# Patient Record
Sex: FEMALE | Race: OTHER | NOT HISPANIC OR LATINO | URBAN - METROPOLITAN AREA
[De-identification: names, ages, dates, MRNs, and addresses within clinical notes are randomized per-mention and may not be internally consistent; named-entity substitution may affect disease eponyms.]

---

## 2022-10-25 ENCOUNTER — OFFICE VISIT (OUTPATIENT)
Dept: PRIMARY CARE CLINIC | Facility: CLINIC | Age: 67
End: 2022-10-25

## 2022-10-25 VITALS
SYSTOLIC BLOOD PRESSURE: 123 MMHG | HEART RATE: 89 BPM | BODY MASS INDEX: 35.18 KG/M2 | OXYGEN SATURATION: 97 % | DIASTOLIC BLOOD PRESSURE: 58 MMHG | TEMPERATURE: 98 F | WEIGHT: 191.19 LBS | HEIGHT: 62 IN

## 2022-10-25 DIAGNOSIS — Z12.31 SCREENING MAMMOGRAM FOR BREAST CANCER: ICD-10-CM

## 2022-10-25 DIAGNOSIS — E78.5 DYSLIPIDEMIA: ICD-10-CM

## 2022-10-25 DIAGNOSIS — R10.32 LLQ PAIN: Primary | ICD-10-CM

## 2022-10-25 DIAGNOSIS — Z78.0 POST-MENOPAUSAL: ICD-10-CM

## 2022-10-25 DIAGNOSIS — Z12.11 SCREEN FOR COLON CANCER: ICD-10-CM

## 2022-10-25 DIAGNOSIS — I10 HYPERTENSION, UNSPECIFIED TYPE: ICD-10-CM

## 2022-10-25 DIAGNOSIS — I10 PRIMARY HYPERTENSION: ICD-10-CM

## 2022-10-25 DIAGNOSIS — R63.4 WEIGHT LOSS: ICD-10-CM

## 2022-10-25 DIAGNOSIS — M54.9 BACK PAIN, UNSPECIFIED BACK LOCATION, UNSPECIFIED BACK PAIN LATERALITY, UNSPECIFIED CHRONICITY: ICD-10-CM

## 2022-10-25 PROCEDURE — 99205 PR OFFICE/OUTPT VISIT, NEW, LEVL V, 60-74 MIN: ICD-10-PCS | Mod: S$PBB,,, | Performed by: NURSE PRACTITIONER

## 2022-10-25 PROCEDURE — 99999 PR PBB SHADOW E&M-NEW PATIENT-LVL V: CPT | Mod: PBBFAC,,, | Performed by: NURSE PRACTITIONER

## 2022-10-25 PROCEDURE — 99205 OFFICE O/P NEW HI 60 MIN: CPT | Mod: PBBFAC,PN | Performed by: NURSE PRACTITIONER

## 2022-10-25 PROCEDURE — 99205 OFFICE O/P NEW HI 60 MIN: CPT | Mod: S$PBB,,, | Performed by: NURSE PRACTITIONER

## 2022-10-25 PROCEDURE — 99999 PR PBB SHADOW E&M-NEW PATIENT-LVL V: ICD-10-PCS | Mod: PBBFAC,,, | Performed by: NURSE PRACTITIONER

## 2022-10-25 RX ORDER — CELECOXIB 100 MG/1
100 CAPSULE ORAL 2 TIMES DAILY PRN
Qty: 60 CAPSULE | Refills: 2 | Status: SHIPPED | OUTPATIENT
Start: 2022-10-25 | End: 2022-11-14 | Stop reason: SDUPTHER

## 2022-10-25 RX ORDER — GABAPENTIN 300 MG/1
300 CAPSULE ORAL NIGHTLY
COMMUNITY
Start: 2022-10-10 | End: 2022-11-14 | Stop reason: SDUPTHER

## 2022-10-25 RX ORDER — ATORVASTATIN CALCIUM 40 MG/1
40 TABLET, FILM COATED ORAL NIGHTLY
COMMUNITY
Start: 2022-10-10 | End: 2022-11-14 | Stop reason: SDUPTHER

## 2022-10-25 RX ORDER — HYDROCHLOROTHIAZIDE 25 MG/1
25 TABLET ORAL DAILY
COMMUNITY
Start: 2022-10-10 | End: 2022-11-14 | Stop reason: ALTCHOICE

## 2022-10-25 NOTE — PROGRESS NOTES
Patience Craft  10/25/2022  [id number]    Lucero Hurtado NP  Patient Care Team:  Lucero Hurtado NP as PCP - General (Family Medicine)      [facility] Note      Chief Complaint:  Chief Complaint   Patient presents with    [facility]     Patient in to [facility] with a complaint of left leg pain that has gotten increasing worse that started in the groin area that radiates to the middle of her back and it hurts to sit, she works offshore, started stretching and it helps and she states when she walk it is noticeable         History of Present Illness:  HPI    Here establish [facility]. No previous PCP.   Works as medic offshore.     Doesn't feel well, LLQ pain, persistent, no change in bowels.   Started left groin/pelvis, now pelvic/left groin and left greater trochanter. Onset several years ago, now becoming worse.    Sweating easily with minimal activity.   Recently began stretching that has helped with mobility, sl decreased pain. Moving better.     Eight lb weight loss past month. 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     No recent health maintenance.     Has had COVID 4 times. Minimal sx.     Takes gabapentin for hip pain. Not sure if effective. Some relief of hip pain with Flexeril.     Had flu shot.    Takes ibuprofen 800 mg for pain as needed with gabapentin. Partial relief. Alternates naproxen and ibuprofen. Takes minimally.     Considering celebrex.     Leaving to go offshore Jan 1.         Review of Systems   Constitutional:  Positive for diaphoresis and weight loss.   HENT:  Negative for hearing loss.    Eyes:  Negative for blurred vision and double vision.   Respiratory:  Positive for shortness of breath (possibly MEJÍA). Negative for cough and sputum production.    Cardiovascular:  Positive for leg swelling (minimal). Negative for chest pain, palpitations and claudication.   Gastrointestinal:  Positive for abdominal pain (LLQ radiates to groin) and heartburn (mild). Negative for blood in  stool, constipation, diarrhea, melena, nausea and vomiting.   Genitourinary:  Positive for frequency and urgency. Negative for dysuria.   Musculoskeletal:  Positive for joint pain (left hip).   Neurological:  Positive for dizziness (with position change, hot shower). Negative for sensory change and weakness.   Psychiatric/Behavioral:  The patient does not have insomnia.        The following were reviewed: Active problem list, medication list, allergies, family history, social history, and Health Maintenance.     History:  Past Medical History:   Diagnosis Date    Hypertension      Past Surgical History:   Procedure Laterality Date     SECTION       Family History   Problem Relation Age of Onset    Hypertension Mother         with CVA    Cancer Mother         breast    Heart disease Father         heavy smoker     Social History     Socioeconomic History    Marital status: Unknown   Tobacco Use    Smoking status: Never    Smokeless tobacco: Never   Substance and Sexual Activity    Alcohol use: Not Currently     Alcohol/week: 2.0 standard drinks     Types: 2 Shots of liquor per week    Drug use: Never     Patient Active Problem List   Diagnosis    Primary hypertension    Dyslipidemia       Review of patient's allergies indicates:  No Known Allergies    Medications:  Current Outpatient Medications on File Prior to Visit   Medication Sig Dispense Refill    atorvastatin (LIPITOR) 40 MG tablet Take 40 mg by mouth every evening.      gabapentin (NEURONTIN) 300 MG capsule Take 300 mg by mouth every evening.      hydroCHLOROthiazide (HYDRODIURIL) 25 MG tablet Take 25 mg by mouth once daily.       No current facility-administered medications on file prior to visit.       Medications have been reviewed and reconciled with patient at visit today.    Barriers to medications present (yes )    Fall since last office visit (no )      Exam:  Vitals:    10/25/22 1053   BP: (!) 123/58   Pulse: 89   Temp: 97.6 °F (36.4 °C)      Weight: 86.7 kg (191 lb 3.2 oz)   Body mass index is 34.97 kg/m².      BP Readings from Last 3 Encounters:   10/25/22 (!) 123/58     Wt Readings from Last 3 Encounters:   10/25/22 1053 86.7 kg (191 lb 3.2 oz)            Physical Exam  Constitutional:       General: She is not in acute distress.     Appearance: She is obese.   HENT:      Right Ear: External ear normal. There is impacted cerumen.      Left Ear: External ear normal. There is impacted cerumen.      Nose: Nose normal.      Mouth/Throat:      Mouth: Mucous membranes are moist.      Pharynx: No oropharyngeal exudate or posterior oropharyngeal erythema.   Eyes:      General: No scleral icterus.     Extraocular Movements: Extraocular movements intact.      Conjunctiva/sclera: Conjunctivae normal.      Pupils: Pupils are equal, round, and reactive to light.   Neck:      Vascular: No carotid bruit.   Cardiovascular:      Rate and Rhythm: Normal rate and regular rhythm.      Pulses:           Dorsalis pedis pulses are 2+ on the right side and 2+ on the left side.        Posterior tibial pulses are 1+ on the right side and 1+ on the left side.      Heart sounds: Normal heart sounds.   Pulmonary:      Effort: Pulmonary effort is normal.      Breath sounds: Normal breath sounds.   Abdominal:      General: Bowel sounds are normal. There is no distension.      Palpations: Abdomen is soft.      Tenderness: There is no abdominal tenderness. There is no right CVA tenderness, left CVA tenderness or guarding.      Hernia: No hernia is present.          Comments: Pain reproduced, no hernia or deformity.    Musculoskeletal:         General: No swelling or deformity. Normal range of motion.      Cervical back: Normal range of motion and neck supple. No tenderness.      Comments: Pain reproduced left SI and left upper gluteal muscle.   No deformity   Lymphadenopathy:      Cervical: No cervical adenopathy.   Skin:     General: Skin is warm and dry.   Neurological:       General: No focal deficit present.      Mental Status: She is alert and oriented to person, place, and time. Mental status is at baseline.      Motor: No weakness.      Gait: Gait normal.   Psychiatric:         Mood and Affect: Mood normal.         Behavior: Behavior normal.       Laboratory Reviewed: (N/A)  No results found for: WBC, HGB, HCT, PLT, CHOL, TRIG, HDL, LDLDIRECT, ALT, AST, NA, K, CL, CREATININE, BUN, CO2, TSH, PSA, INR, GLUF, HGBA1C, MICROALBUR        Health Maintenance  The patient has no Health Maintenance topics of status Not Due  Health Maintenance Due   Topic Date Due    Lipid Panel  Never done    COVID-19 Vaccine (1) Never done    Mammogram  Never done    DEXA Scan  Never done    Colorectal Cancer Screening  Never done    Shingles Vaccine (1 of 2) Never done       Assessment:  Problem List Items Addressed This Visit          Cardiac/Vascular    Primary hypertension     Refill HCTZ. Check CMP         Dyslipidemia     Atorvastatin. Check CMP, lipid panel.          Relevant Orders    Lipid Panel    Lipid Panel     Other Visit Diagnoses       LLQ pain    -  Primary    Relevant Orders    Urinalysis, Reflex to Urine Culture Urine, Clean Catch    POCT URINE DIPSTICK WITHOUT MICROSCOPE    CBC Auto Differential    Comprehensive Metabolic Panel    Screening mammogram for breast cancer        Relevant Orders    Mammo Digital Screening Bilat w/ Gavino    Post-menopausal        Relevant Orders    DXA Bone Density Spine And Hip    Hypertension, unspecified type        Relevant Orders    CBC Auto Differential    Comprehensive Metabolic Panel    Screen for colon cancer        Relevant Orders    Fecal Immunochemical Test (iFOBT)    Back pain, unspecified back location, unspecified back pain laterality, unspecified chronicity        Relevant Medications    celecoxib (CELEBREX) 100 MG capsule    Other Relevant Orders    X-Ray Lumbar Spine 5 View    X-Ray Hip 3 or 4 views Bilateral    Weight loss        Relevant  Orders    TSH    TSH          Left groin pain - start xray L-spine and left hip, UA. Consider CT abd/pelvis.           Plan:  LLQ pain  -     Urinalysis, Reflex to Urine Culture Urine, Clean Catch; Future; Expected date: 10/25/2022  -     POCT URINE DIPSTICK WITHOUT MICROSCOPE  -     CBC Auto Differential; Future; Expected date: 10/25/2022  -     Comprehensive Metabolic Panel; Future; Expected date: 10/25/2022    Screening mammogram for breast cancer  -     Mammo Digital Screening Bilat w/ Gavino; Future; Expected date: 10/25/2022    Post-menopausal  -     DXA Bone Density Spine And Hip; Future; Expected date: 10/25/2022    Hypertension, unspecified type  -     Cancel: IN OFFICE EKG 12-LEAD (to Muse)  -     CBC Auto Differential; Future; Expected date: 10/25/2022  -     Comprehensive Metabolic Panel; Future; Expected date: 10/25/2022    Screen for colon cancer  -     Fecal Immunochemical Test (iFOBT); Future; Expected date: 10/25/2022    Dyslipidemia  -     Lipid Panel; Future; Expected date: 10/25/2022  -     Lipid Panel; Future; Expected date: 10/25/2022    Back pain, unspecified back location, unspecified back pain laterality, unspecified chronicity  -     X-Ray Lumbar Spine 5 View; Future; Expected date: 10/25/2022  -     X-Ray Hip 3 or 4 views Bilateral; Future; Expected date: 10/25/2022  -     celecoxib (CELEBREX) 100 MG capsule; Take 1 capsule (100 mg total) by mouth 2 (two) times daily as needed for Pain (back/hip pain).  Dispense: 60 capsule; Refill: 2    Weight loss  -     TSH; Future; Expected date: 10/25/2022  -     Cancel: T4, FREE; Future; Expected date: 10/25/2022  -     TSH; Future; Expected date: 10/25/2022    Primary hypertension    -Patient's lab results were reviewed and discussed with patient  -Treatment options and alternatives were discussed with the patient. Patient expressed understanding. Patient was given the opportunity to ask questions and be an active participant in their medical care.  Patient had no further questions or concerns at this time.   -Documentation of patient's health and condition was obtained from family member who was present during visit.  -Patient is an overall moderate risk for health complications from their medical conditions.     Pt reports she has only Medicare part A insurance so POC is limited by financial means. Discussed recommendations, pt has decided to defer health maintenance until she has further insurance coverage. She will have lab and xrays done today.    Follow up: Follow up in about 3 weeks (around 11/15/2022).      After visit summary printed and given to patient upon discharge.  Patient goals and care plan are included in After visit summary.    Total medical decision making time was 62 min.  The following issues were discussed: The primary encounter diagnosis was LLQ pain. Diagnoses of Screening mammogram for breast cancer, Post-menopausal, Hypertension, unspecified type, Screen for colon cancer, Dyslipidemia, Back pain, unspecified back location, unspecified back pain laterality, unspecified chronicity, Weight loss, and Primary hypertension were also pertinent to this visit.    Health maintenance needs, recent test results and goals of care discussed with pt and questions answered.

## 2022-10-26 ENCOUNTER — HOSPITAL ENCOUNTER (OUTPATIENT)
Dept: RADIOLOGY | Facility: HOSPITAL | Age: 67
Discharge: HOME | End: 2022-10-26
Attending: NURSE PRACTITIONER

## 2022-10-26 ENCOUNTER — TELEPHONE (OUTPATIENT)
Dept: PRIMARY CARE CLINIC | Facility: CLINIC | Age: 67
End: 2022-10-26

## 2022-10-26 DIAGNOSIS — M54.9 BACK PAIN, UNSPECIFIED BACK LOCATION, UNSPECIFIED BACK PAIN LATERALITY, UNSPECIFIED CHRONICITY: ICD-10-CM

## 2022-10-26 PROCEDURE — 73522 X-RAY EXAM HIPS BI 3-4 VIEWS: CPT | Mod: TC

## 2022-10-26 PROCEDURE — 72110 XR LUMBAR SPINE COMPLETE 5 VIEW: ICD-10-PCS | Mod: 26,,, | Performed by: RADIOLOGY

## 2022-10-26 PROCEDURE — 73522 XR HIP 3 OR 4 VIEWS BILATERAL: ICD-10-PCS | Mod: 26,,, | Performed by: RADIOLOGY

## 2022-10-26 PROCEDURE — 72110 X-RAY EXAM L-2 SPINE 4/>VWS: CPT | Mod: TC

## 2022-10-26 PROCEDURE — 73522 X-RAY EXAM HIPS BI 3-4 VIEWS: CPT | Mod: 26,,, | Performed by: RADIOLOGY

## 2022-10-26 PROCEDURE — 72110 X-RAY EXAM L-2 SPINE 4/>VWS: CPT | Mod: 26,,, | Performed by: RADIOLOGY

## 2022-10-26 NOTE — TELEPHONE ENCOUNTER
----- Message from Lucero Hurtado NP sent at 10/26/2022  9:08 AM CDT -----  Xrays show severe degenerative changes/arthritis to spine.   Would benefit from pain mgmt/AGUILA if other pain control measures fail.   I'm checking to see if someone will reach out to her to help get part B.

## 2022-10-26 NOTE — TELEPHONE ENCOUNTER
Called patient and notified of the results and recommendations. Patient verbalized understanding, Arelis Ramos

## 2022-10-27 ENCOUNTER — TELEPHONE (OUTPATIENT)
Dept: PRIMARY CARE CLINIC | Facility: CLINIC | Age: 67
End: 2022-10-27

## 2022-10-27 NOTE — TELEPHONE ENCOUNTER
----- Message from Lucero Hurtado NP sent at 10/27/2022  7:55 AM CDT -----  MARITZA mildly elevated but near goal for your age range. Discuss whether to start replacement at upcoming appt.

## 2022-11-14 ENCOUNTER — OFFICE VISIT (OUTPATIENT)
Dept: PRIMARY CARE CLINIC | Facility: CLINIC | Age: 67
End: 2022-11-14
Payer: MEDICARE

## 2022-11-14 VITALS
DIASTOLIC BLOOD PRESSURE: 68 MMHG | HEART RATE: 86 BPM | TEMPERATURE: 98 F | SYSTOLIC BLOOD PRESSURE: 133 MMHG | BODY MASS INDEX: 35.63 KG/M2 | WEIGHT: 193.63 LBS | OXYGEN SATURATION: 96 % | HEIGHT: 62 IN

## 2022-11-14 DIAGNOSIS — R94.6 THYROID FUNCTION TEST ABNORMAL: Primary | ICD-10-CM

## 2022-11-14 DIAGNOSIS — M16.0 OSTEOARTHRITIS OF BOTH HIPS, UNSPECIFIED OSTEOARTHRITIS TYPE: Chronic | ICD-10-CM

## 2022-11-14 DIAGNOSIS — M16.10 OSTEOARTHRITIS OF PELVIC REGION: ICD-10-CM

## 2022-11-14 DIAGNOSIS — M54.9 BACK PAIN, UNSPECIFIED BACK LOCATION, UNSPECIFIED BACK PAIN LATERALITY, UNSPECIFIED CHRONICITY: ICD-10-CM

## 2022-11-14 DIAGNOSIS — E66.01 CLASS 2 SEVERE OBESITY WITH SERIOUS COMORBIDITY AND BODY MASS INDEX (BMI) OF 35.0 TO 35.9 IN ADULT, UNSPECIFIED OBESITY TYPE: ICD-10-CM

## 2022-11-14 DIAGNOSIS — E78.5 DYSLIPIDEMIA: Chronic | ICD-10-CM

## 2022-11-14 DIAGNOSIS — I10 PRIMARY HYPERTENSION: Chronic | ICD-10-CM

## 2022-11-14 PROCEDURE — 99213 OFFICE O/P EST LOW 20 MIN: CPT | Mod: PBBFAC,PN | Performed by: INTERNAL MEDICINE

## 2022-11-14 PROCEDURE — 99215 OFFICE O/P EST HI 40 MIN: CPT | Mod: S$PBB,,, | Performed by: INTERNAL MEDICINE

## 2022-11-14 PROCEDURE — 99999 PR PBB SHADOW E&M-EST. PATIENT-LVL III: ICD-10-PCS | Mod: PBBFAC,,, | Performed by: INTERNAL MEDICINE

## 2022-11-14 PROCEDURE — 99999 PR PBB SHADOW E&M-EST. PATIENT-LVL III: CPT | Mod: PBBFAC,,, | Performed by: INTERNAL MEDICINE

## 2022-11-14 PROCEDURE — 99215 PR OFFICE/OUTPT VISIT, EST, LEVL V, 40-54 MIN: ICD-10-PCS | Mod: S$PBB,,, | Performed by: INTERNAL MEDICINE

## 2022-11-14 RX ORDER — HYDROCHLOROTHIAZIDE 25 MG/1
25 TABLET ORAL DAILY
Status: CANCELLED | OUTPATIENT
Start: 2022-11-14

## 2022-11-14 RX ORDER — GABAPENTIN 300 MG/1
300 CAPSULE ORAL NIGHTLY
Qty: 90 CAPSULE | Refills: 1 | Status: SHIPPED | OUTPATIENT
Start: 2022-11-14 | End: 2023-05-13

## 2022-11-14 RX ORDER — CELECOXIB 100 MG/1
100 CAPSULE ORAL 2 TIMES DAILY PRN
Qty: 180 CAPSULE | Refills: 1 | Status: SHIPPED | OUTPATIENT
Start: 2022-11-14 | End: 2023-05-13

## 2022-11-14 RX ORDER — ACETAMINOPHEN 160 MG/5ML
200 SUSPENSION, ORAL (FINAL DOSE FORM) ORAL DAILY
Qty: 90 CAPSULE | Refills: 1 | Status: SHIPPED | OUTPATIENT
Start: 2022-11-14 | End: 2023-08-08

## 2022-11-14 RX ORDER — CHLORTHALIDONE 25 MG/1
25 TABLET ORAL DAILY
Qty: 30 TABLET | Refills: 11 | Status: SHIPPED | OUTPATIENT
Start: 2022-11-14 | End: 2023-11-27 | Stop reason: SDUPTHER

## 2022-11-14 RX ORDER — ATORVASTATIN CALCIUM 40 MG/1
40 TABLET, FILM COATED ORAL NIGHTLY
Qty: 90 TABLET | Refills: 1 | Status: SHIPPED | OUTPATIENT
Start: 2022-11-14 | End: 2023-06-01

## 2022-11-14 RX ORDER — GABAPENTIN 100 MG/1
100 CAPSULE ORAL 2 TIMES DAILY PRN
Qty: 180 CAPSULE | Refills: 1 | Status: SHIPPED | OUTPATIENT
Start: 2022-11-14 | End: 2023-08-08

## 2022-11-14 NOTE — PATIENT INSTRUCTIONS
Cont monitor BP at home - limit sodium intake - compression socks/elevate legs    Cont stretches and stay active and mobile

## 2022-11-14 NOTE — PROGRESS NOTES
Patience Craft  11/14/2022  [id number]    Natalie Guzmán MD  Patient Care Team:  Natalie Guzmán MD as PCP - General (Internal Medicine)  Lucero Hurtado NP as Nurse Practitioner (Family Medicine)    [facility] Note    Chief Complaint:  Chief Complaint   Patient presents with    Follow-up    Medication refills      History of Present Illness:  Met w Lucero Hurtado, 10/25/22, to establish care.  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     Pt w Medicare part A insurance only so POC limited by financial constraints and pt deferring some recommended health maintenance until she has further insurance coverage.     L hip/groin and midline buttock (base of spine) pain  Doing stretches that's helping.    C/o swelling R leg when up on feet. Compression socks helping.    Has tried taking celebrex BID per rec of Lucero Hurtado which has helped.    Intermittent dizziness when lying down.    Gabapentin helped but sedating.    Decreased appetite - unintentional weight loss - increased diaphoresis worsening over past 6 mos.    Review of Systems   Constitutional:  Positive for diaphoresis and weight loss.   HENT:  Negative for hearing loss.    Eyes:  Negative for blurred vision and double vision.   Respiratory:  Positive for shortness of breath (possibly MEJÍA). Negative for cough and sputum production.    Cardiovascular:  Positive for leg swelling (minimal). Negative for chest pain, palpitations and claudication.   Gastrointestinal:  Positive for abdominal pain (LLQ radiates to groin) and heartburn (mild). Negative for blood in stool, constipation, diarrhea, melena, nausea and vomiting.   Genitourinary:  Positive for frequency and urgency. Negative for dysuria.   Musculoskeletal:  Positive for joint pain (left hip).   Neurological:  Positive for dizziness (with position change, hot shower). Negative for sensory change and weakness.   Psychiatric/Behavioral:  The patient does  not have insomnia.      The following were reviewed: Active problem list, medication list, allergies, family history, social history, and Health Maintenance.     History:  Past Medical History:   Diagnosis Date    Hypertension      Past Surgical History:   Procedure Laterality Date     SECTION       Family History   Problem Relation Age of Onset    Hypertension Mother         with CVA    Cancer Mother         breast    Heart disease Father         heavy smoker     Social History     Socioeconomic History    Marital status: Other   Tobacco Use    Smoking status: Never    Smokeless tobacco: Never   Substance and Sexual Activity    Alcohol use: Not Currently     Alcohol/week: 2.0 standard drinks     Types: 2 Shots of liquor per week    Drug use: Never     Patient Active Problem List   Diagnosis    Primary hypertension    Dyslipidemia    Osteoarthritis, hip, bilateral    Back pain    Thyroid function test abnormal    Class 2 severe obesity with serious comorbidity and body mass index (BMI) of 35.0 to 35.9 in adult    Osteoarthritis of pelvic region       Review of patient's allergies indicates:  No Known Allergies    Medications:  No current outpatient medications on file prior to visit.     No current facility-administered medications on file prior to visit.     Medications have been reviewed and reconciled with patient at visit today.    Barriers to medications present (yes)  Financial primarily    Exam:  Vitals:    22 0824   BP: 133/68   Pulse: 86   Temp: 97.7 °F (36.5 °C)     Weight: 87.8 kg (193 lb 9.6 oz)   Body mass index is 35.41 kg/m².    BP Readings from Last 3 Encounters:   22 133/68   10/25/22 (!) 123/58     Wt Readings from Last 3 Encounters:   22 0824 87.8 kg (193 lb 9.6 oz)   10/25/22 1053 86.7 kg (191 lb 3.2 oz)      Physical Exam  Constitutional:       General: She is not in acute distress.     Appearance: She is obese.   HENT:      Right Ear: External ear normal.      Left  Ear: External ear normal.      Nose: Nose normal.      Mouth/Throat:      Mouth: Mucous membranes are moist.      Pharynx: No oropharyngeal exudate or posterior oropharyngeal erythema.   Eyes:      General: No scleral icterus.     Extraocular Movements: Extraocular movements intact.      Conjunctiva/sclera: Conjunctivae normal.   Neck:      Vascular: No carotid bruit.   Cardiovascular:      Rate and Rhythm: Normal rate and regular rhythm.      Heart sounds: Normal heart sounds.   Pulmonary:      Effort: Pulmonary effort is normal.      Breath sounds: Normal breath sounds.   Abdominal:      General: Bowel sounds are normal. There is no distension.      Palpations: Abdomen is soft.      Tenderness: There is no abdominal tenderness. There is no right CVA tenderness, left CVA tenderness or guarding.      Hernia: No hernia is present.   Musculoskeletal:         General: No swelling or deformity. Normal range of motion.      Cervical back: Normal range of motion and neck supple. No tenderness.   Lymphadenopathy:      Cervical: No cervical adenopathy.   Skin:     General: Skin is warm and dry.   Neurological:      General: No focal deficit present.      Mental Status: She is alert and oriented to person, place, and time. Mental status is at baseline.      Motor: No weakness.      Gait: Gait normal.   Psychiatric:         Mood and Affect: Mood normal.         Behavior: Behavior normal.     Laboratory Reviewed: Yes  Lab Results   Component Value Date    WBC 7.56 10/26/2022    HGB 14.3 10/26/2022    HCT 44.1 10/26/2022     10/26/2022    CHOL 155 10/26/2022    TRIG 76 10/26/2022    HDL 45 10/26/2022    ALT 21 10/26/2022    AST 17 10/26/2022     10/26/2022    K 3.8 10/26/2022     10/26/2022    CREATININE 0.8 10/26/2022    BUN 10 10/26/2022    CO2 22 (L) 10/26/2022    TSH 5.722 (H) 10/26/2022     10/26/22: LDL 94.8 free T4  0.89 (low normal) eGFR > 60    Xray Hip 10/26/22: No acute osseous abnormality.   "Prominent degenerative findings of the left hip noted including joint space loss, subcortical sclerosis and osteophyte formation.  Minimal right hip degenerative findings.  Moderate to severe degenerative findings of the lower lumbar spine.  Pubic symphysis degenerative findings noted.  Soft tissues unremarkable. Impression: Degenerative findings most prevalent at the lumbar spine and left hip.    Health Maintenance  Health Maintenance Topics with due status: Not Due       Topic Last Completion Date    Lipid Panel 10/26/2022     Health Maintenance Due   Topic Date Due    COVID-19 Vaccine (1) Never done    Mammogram  Never done    DEXA Scan  Never done    Colorectal Cancer Screening  Never done    Shingles Vaccine (1 of 2) Never done     Assessment:  Problem List Items Addressed This Visit          Cardiac/Vascular    Primary hypertension (Chronic)     Cont chlorthalidone (switch from HCTZ) cont efforts to limit sodium and to stay mobile and active         Dyslipidemia (Chronic)     Cont statin - consider add CoQ10             Endocrine    Class 2 severe obesity with serious comorbidity and body mass index (BMI) of 35.0 to 35.9 in adult (Chronic)     Reports unintentional weight loss but has been eating better while on[place]shore - encourage healthy weight loss - may help decrease OA pain - f/u on repeat TFTs in Dec         Thyroid function test abnormal - Primary    Relevant Orders    TSH    T4, Free       Orthopedic    Osteoarthritis, hip, bilateral (Chronic)     L > R encourage to stay mobile and active to extent able - cont stretches, celebrex         Back pain (Chronic)     Note Moderate to severe degenerative findings of the lower lumbar spine on Xray 10/26/22. May warrant further evaluation however pt has financial/insurance limitations. Cont stretching exercises, celebrex, gabapentin as needed/tolerated. Monitor for "red flag" symptoms or findings.         Relevant Medications    celecoxib (CELEBREX) 100 MG " capsule    Osteoarthritis of pelvic region     Pubic symphysis degenerative findings noted on Xray 10/26/22- cont celebrex          Plan:  Thyroid function test abnormal  -     TSH; Future; Expected date: 12/05/2022  -     T4, Free; Future; Expected date: 12/05/2022    Back pain, unspecified back location, unspecified back pain laterality, unspecified chronicity  -     celecoxib (CELEBREX) 100 MG capsule; Take 1 capsule (100 mg total) by mouth 2 (two) times daily as needed for Pain (back/hip pain).  Dispense: 180 capsule; Refill: 1    Osteoarthritis of both hips, unspecified osteoarthritis type    Dyslipidemia    Primary hypertension    Class 2 severe obesity with serious comorbidity and body mass index (BMI) of 35.0 to 35.9 in adult, unspecified obesity type    Osteoarthritis of pelvic region    Other orders  -     atorvastatin (LIPITOR) 40 MG tablet; Take 1 tablet (40 mg total) by mouth every evening.  Dispense: 90 tablet; Refill: 1  -     chlorthalidone (HYGROTEN) 25 MG Tab; Take 1 tablet (25 mg total) by mouth once daily.  Dispense: 30 tablet; Refill: 11  -     gabapentin (NEURONTIN) 300 MG capsule; Take 1 capsule (300 mg total) by mouth every evening.  Dispense: 90 capsule; Refill: 1  -     gabapentin (NEURONTIN) 100 MG capsule; Take 1 capsule (100 mg total) by mouth 2 (two) times daily as needed (take in morning and afternoon as needed for nerve pain).  Dispense: 180 capsule; Refill: 1  -     coenzyme Q10 200 mg capsule; Take 200 mg by mouth once daily.  Dispense: 90 capsule; Refill: 1      -Patient's lab and Xray results were reviewed and discussed with patient  -Treatment options and alternatives were discussed with the patient. Patient expressed understanding. Patient was given the opportunity to ask questions and be an active participant in their medical care. Patient had no further questions or concerns at this time.    -Patient is an overall moderate risk for health complications from their medical  conditions.     Follow up: Follow up in about 23 days (around 12/7/2022) for Follow Up w either me or Lucero.    After visit summary printed and given to patient upon discharge.  Patient care plan included in After visit summary.    Total medical decision making time was 62 min.  The following issues were discussed: The primary encounter diagnosis was Thyroid function test abnormal. Diagnoses of Back pain, unspecified back location, unspecified back pain laterality, unspecified chronicity, Osteoarthritis of both hips, unspecified osteoarthritis type, Dyslipidemia, Primary hypertension, Class 2 severe obesity with serious comorbidity and body mass index (BMI) of 35.0 to 35.9 in adult, unspecified obesity type, and Osteoarthritis of pelvic region were also pertinent to this visit.    Health maintenance needs, recent test results and goals of care discussed with pt and questions answered.

## 2022-12-18 PROBLEM — E66.01 CLASS 2 SEVERE OBESITY WITH SERIOUS COMORBIDITY AND BODY MASS INDEX (BMI) OF 35.0 TO 35.9 IN ADULT: Status: ACTIVE | Noted: 2022-11-14

## 2022-12-18 PROBLEM — M54.9 BACK PAIN: Chronic | Status: ACTIVE | Noted: 2022-12-18

## 2022-12-18 PROBLEM — M54.9 BACK PAIN: Chronic | Status: ACTIVE | Noted: 2022-11-14

## 2022-12-18 PROBLEM — E66.812 CLASS 2 SEVERE OBESITY WITH SERIOUS COMORBIDITY AND BODY MASS INDEX (BMI) OF 35.0 TO 35.9 IN ADULT: Status: ACTIVE | Noted: 2022-11-14

## 2022-12-18 PROBLEM — E78.5 DYSLIPIDEMIA: Chronic | Status: ACTIVE | Noted: 2022-10-25

## 2022-12-18 PROBLEM — R94.6 THYROID FUNCTION TEST ABNORMAL: Status: ACTIVE | Noted: 2022-12-18

## 2022-12-18 PROBLEM — M16.0 OSTEOARTHRITIS, HIP, BILATERAL: Chronic | Status: ACTIVE | Noted: 2022-11-14

## 2022-12-18 PROBLEM — M54.9 BACK PAIN: Status: ACTIVE | Noted: 2022-12-18

## 2022-12-18 PROBLEM — E66.812 CLASS 2 SEVERE OBESITY WITH SERIOUS COMORBIDITY AND BODY MASS INDEX (BMI) OF 35.0 TO 35.9 IN ADULT: Chronic | Status: ACTIVE | Noted: 2022-11-14

## 2022-12-18 PROBLEM — E66.01 CLASS 2 SEVERE OBESITY WITH SERIOUS COMORBIDITY AND BODY MASS INDEX (BMI) OF 35.0 TO 35.9 IN ADULT: Chronic | Status: ACTIVE | Noted: 2022-11-14

## 2022-12-18 PROBLEM — M16.10 OSTEOARTHRITIS OF PELVIC REGION: Status: ACTIVE | Noted: 2022-11-14

## 2022-12-18 PROBLEM — I10 PRIMARY HYPERTENSION: Chronic | Status: ACTIVE | Noted: 2022-10-25

## 2022-12-18 NOTE — ASSESSMENT & PLAN NOTE
Reports unintentional weight loss but has been eating better while [place] - encourage healthy weight loss - may help decrease OA pain - f/u on repeat TFTs in Dec

## 2022-12-18 NOTE — ASSESSMENT & PLAN NOTE
"Note Moderate to severe degenerative findings of the lower lumbar spine on Xray 10/26/22. May warrant further evaluation however pt has financial/insurance limitations. Cont stretching exercises, celebrex, gabapentin as needed/tolerated. Monitor for "red flag" symptoms or findings.  "

## 2022-12-20 ENCOUNTER — TELEPHONE (OUTPATIENT)
Dept: PRIMARY CARE CLINIC | Facility: CLINIC | Age: 67
End: 2022-12-20
Payer: MEDICARE

## 2022-12-20 NOTE — TELEPHONE ENCOUNTER
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  Check if she wants to schedule f/u w either Lucero or I before she goes?  Does she need refills on any of her medicines?  Has she gotten debrox to use for her ears? If she hasn't please advise her to do so (5 drops each ear 2x daily for 4-5 days) then schedule f/u Nurse visit to recheck ears for possible ear wash if needed - may help w her positional vertigo or at least allow visualization of TMs.  I had ordered repeat TSH/free T4 for Dec but she hasn't had these done yet.   Please assist her w scheduling to have these drawn this week so we can at least advise her on next steps before she goes whether or not we see her for actual clinic visit or not ...    Thank you!

## 2022-12-20 NOTE — TELEPHONE ENCOUNTER
Spoke with Patient, states that she is not going back off shore. She is starting a new job and wants to wait for her new insurance to start before she schedules f/u. Patient states that she will call us to make appointment.

## 2023-01-20 ENCOUNTER — PATIENT OUTREACH (OUTPATIENT)
Dept: ADMINISTRATIVE | Facility: HOSPITAL | Age: 68
End: 2023-01-20
Payer: MEDICARE

## 2023-01-20 NOTE — PROGRESS NOTES
Mammogram report: Attempted to contact the patient to schedule overdue mammogram, left voicemail.

## 2023-04-26 ENCOUNTER — TELEPHONE (OUTPATIENT)
Dept: PRIMARY CARE CLINIC | Facility: CLINIC | Age: 68
End: 2023-04-26

## 2023-06-01 RX ORDER — ATORVASTATIN CALCIUM 40 MG/1
TABLET, FILM COATED ORAL
Qty: 90 TABLET | Refills: 0 | Status: SHIPPED | OUTPATIENT
Start: 2023-06-01 | End: 2023-08-24

## 2023-08-07 ENCOUNTER — OFFICE VISIT (OUTPATIENT)
Dept: PRIMARY CARE CLINIC | Facility: CLINIC | Age: 68
End: 2023-08-07

## 2023-08-07 ENCOUNTER — HOSPITAL ENCOUNTER (OUTPATIENT)
Dept: RADIOLOGY | Facility: HOSPITAL | Age: 68
Discharge: HOME | End: 2023-08-07
Attending: NURSE PRACTITIONER

## 2023-08-07 ENCOUNTER — TELEPHONE (OUTPATIENT)
Dept: PRIMARY CARE CLINIC | Facility: CLINIC | Age: 68
End: 2023-08-07

## 2023-08-07 VITALS
HEART RATE: 94 BPM | OXYGEN SATURATION: 97 % | TEMPERATURE: 98 F | BODY MASS INDEX: 33.68 KG/M2 | DIASTOLIC BLOOD PRESSURE: 78 MMHG | SYSTOLIC BLOOD PRESSURE: 128 MMHG | WEIGHT: 183 LBS | HEIGHT: 62 IN

## 2023-08-07 DIAGNOSIS — R10.30 LOWER ABDOMINAL PAIN: Primary | ICD-10-CM

## 2023-08-07 DIAGNOSIS — R94.6 THYROID FUNCTION TEST ABNORMAL: ICD-10-CM

## 2023-08-07 DIAGNOSIS — R10.30 LOWER ABDOMINAL PAIN: ICD-10-CM

## 2023-08-07 PROBLEM — E66.01 CLASS 2 SEVERE OBESITY WITH SERIOUS COMORBIDITY AND BODY MASS INDEX (BMI) OF 35.0 TO 35.9 IN ADULT: Chronic | Status: RESOLVED | Noted: 2022-11-14 | Resolved: 2023-08-07

## 2023-08-07 PROBLEM — E66.812 CLASS 2 SEVERE OBESITY WITH SERIOUS COMORBIDITY AND BODY MASS INDEX (BMI) OF 35.0 TO 35.9 IN ADULT: Chronic | Status: RESOLVED | Noted: 2022-11-14 | Resolved: 2023-08-07

## 2023-08-07 PROCEDURE — 99215 OFFICE O/P EST HI 40 MIN: CPT | Mod: S$PBB,,, | Performed by: NURSE PRACTITIONER

## 2023-08-07 PROCEDURE — 74178 CT ABDOMEN PELVIS W WO CONTRAST: ICD-10-PCS | Mod: 26,,, | Performed by: RADIOLOGY

## 2023-08-07 PROCEDURE — 99215 PR OFFICE/OUTPT VISIT, EST, LEVL V, 40-54 MIN: ICD-10-PCS | Mod: S$PBB,,, | Performed by: NURSE PRACTITIONER

## 2023-08-07 PROCEDURE — 99214 OFFICE O/P EST MOD 30 MIN: CPT | Mod: PBBFAC,PN,25 | Performed by: NURSE PRACTITIONER

## 2023-08-07 PROCEDURE — 99999 PR PBB SHADOW E&M-EST. PATIENT-LVL IV: CPT | Mod: PBBFAC,,, | Performed by: NURSE PRACTITIONER

## 2023-08-07 PROCEDURE — 99999 PR PBB SHADOW E&M-EST. PATIENT-LVL IV: ICD-10-PCS | Mod: PBBFAC,,, | Performed by: NURSE PRACTITIONER

## 2023-08-07 PROCEDURE — 25500020 PHARM REV CODE 255: Performed by: NURSE PRACTITIONER

## 2023-08-07 PROCEDURE — 74178 CT ABD&PLV WO CNTR FLWD CNTR: CPT | Mod: TC

## 2023-08-07 PROCEDURE — 74178 CT ABD&PLV WO CNTR FLWD CNTR: CPT | Mod: 26,,, | Performed by: RADIOLOGY

## 2023-08-07 RX ADMIN — IOHEXOL 100 ML: 350 INJECTION, SOLUTION INTRAVENOUS at 01:08

## 2023-08-07 NOTE — ASSESSMENT & PLAN NOTE
Subacute.  With associated diarrhea, weight los, decreased appetite and urinary frequency.   CT abd/pelvis stat.   CBC, HFP, RFP, UA.   To ER if sx worsen.

## 2023-08-07 NOTE — PROGRESS NOTES
Patience Craft  08/07/2023  [id number]    Natalie Guzmán MD  Patient Care Team:  Natalie Guzmán MD as PCP - General (Internal Medicine)  Lucero Hurtado NP as Nurse Practitioner (Family Medicine)      [facility]      Chief Complaint:  Chief Complaint   Patient presents with    Establish Care    Chest Pain    Abdominal Pain     X 6 months loss of appetite loss of 17 lbs, Pain 4 LLQ    Dizziness    Excessive Sweating    Back Pain     X 2 months       History of Present Illness:  HPI    Seen today for abdominal pain.     LOV 11/2022. Back pain. Only recently started Medicare A/B.   Works off shore/out of state. In [place] for limited time during the year.     Unintentional weight loss and occasional diarrhea. Decreased appetite.   Pain across lower abdomen/pelvis. Becoming worse. Onset of sx within past couple of months.       Review of Systems   Constitutional:  Positive for appetite change and unexpected weight change. Negative for activity change and fever.   HENT:  Negative for congestion.    Respiratory:  Negative for cough, chest tightness and shortness of breath.    Cardiovascular:  Negative for chest pain.   Gastrointestinal:  Positive for abdominal pain and diarrhea. Negative for abdominal distention, anal bleeding and rectal pain.   Genitourinary:  Positive for frequency. Negative for decreased urine volume, difficulty urinating, dysuria and flank pain.   Musculoskeletal:  Positive for back pain (LBP).   Neurological:  Positive for dizziness. Negative for headaches.         The following were reviewed: Active problem list, medication list, allergies, family history, social history, and Health Maintenance.       Medications:  Current Outpatient Medications on File Prior to Visit   Medication Sig Dispense Refill    atorvastatin (LIPITOR) 40 MG tablet TAKE 1 TABLET BY MOUTH ONCE DAILY IN THE EVENING 90 tablet 0    chlorthalidone (HYGROTEN) 25 MG Tab Take 1 tablet (25 mg total) by mouth  once daily. 30 tablet 11    coenzyme Q10 200 mg capsule Take 200 mg by mouth once daily. 90 capsule 1    gabapentin (NEURONTIN) 100 MG capsule Take 1 capsule (100 mg total) by mouth 2 (two) times daily as needed (take in morning and afternoon as needed for nerve pain). 180 capsule 1     No current facility-administered medications on file prior to visit.       Medications have been reviewed and reconciled with patient at visit today.    Barriers to medications present (no )    Fall since last office visit (no )      Exam:  Vitals:    08/07/23 0820   BP: 128/78   Pulse: 94   Temp: 97.6 °F (36.4 °C)     Weight: 83 kg (183 lb)   Body mass index is 33.47 kg/m².      BP Readings from Last 3 Encounters:   08/07/23 128/78   11/14/22 133/68   10/25/22 (!) 123/58     Wt Readings from Last 3 Encounters:   08/07/23 0820 83 kg (183 lb)   11/14/22 0824 87.8 kg (193 lb 9.6 oz)   10/25/22 1053 86.7 kg (191 lb 3.2 oz)            Physical Exam  Constitutional:       General: She is not in acute distress.     Appearance: She is not ill-appearing.   HENT:      Head: Normocephalic.      Right Ear: There is impacted cerumen.      Left Ear: Tympanic membrane normal.      Nose: Nose normal.      Mouth/Throat:      Mouth: Mucous membranes are moist.      Pharynx: No oropharyngeal exudate or posterior oropharyngeal erythema.   Eyes:      General: No scleral icterus.  Cardiovascular:      Rate and Rhythm: Normal rate and regular rhythm.      Heart sounds: Normal heart sounds.   Pulmonary:      Effort: Pulmonary effort is normal.      Breath sounds: Normal breath sounds.   Abdominal:      General: Bowel sounds are normal. There is no distension.      Palpations: Abdomen is soft. There is no mass.      Tenderness: There is abdominal tenderness (Mid-lower abd and LLQ.). There is rebound. There is no right CVA tenderness, left CVA tenderness or guarding.      Hernia: No hernia is present.   Musculoskeletal:         General: No swelling.       Cervical back: Neck supple.   Lymphadenopathy:      Cervical: No cervical adenopathy.   Skin:     General: Skin is warm and dry.      Coloration: Skin is not jaundiced.   Neurological:      Mental Status: She is alert. Mental status is at baseline.   Psychiatric:         Mood and Affect: Mood normal.         Behavior: Behavior normal.         Laboratory Reviewed: (Yes)  Lab Results   Component Value Date    WBC 7.56 10/26/2022    HGB 14.3 10/26/2022    HCT 44.1 10/26/2022     10/26/2022    CHOL 155 10/26/2022    TRIG 76 10/26/2022    HDL 45 10/26/2022    ALT 21 10/26/2022    AST 17 10/26/2022     10/26/2022    K 3.8 10/26/2022     10/26/2022    CREATININE 0.8 10/26/2022    BUN 10 10/26/2022    CO2 22 (L) 10/26/2022    TSH 5.722 (H) 10/26/2022           Health Maintenance  Health Maintenance Topics with due status: Not Due       Topic Last Completion Date    Influenza Vaccine 09/21/2022    Lipid Panel 10/26/2022     Health Maintenance Due   Topic Date Due    COVID-19 Vaccine (1) Never done    Mammogram  Never done    Hemoglobin A1c (Diabetic Prevention Screening)  Never done    DEXA Scan  Never done    Colorectal Cancer Screening  Never done    Shingles Vaccine (1 of 2) Never done         Assessment:  Problem List Items Addressed This Visit          Endocrine    Thyroid function test abnormal     Lab Results   Component Value Date    TSH 5.722 (H) 10/26/2022               GI    Lower abdominal pain - Primary     Subacute.  With associated diarrhea, weight los, decreased appetite and urinary frequency.   CT abd/pelvis stat.   CBC, HFP, RFP, UA.   To ER if sx worsen.         Relevant Orders    X-Ray Abdomen Flat And Erect    Hepatic Function Panel    RENAL FUNCTION PANEL    CBC Auto Differential    Urinalysis, Reflex to Urine Culture Urine, Clean Catch    CT Abdomen Pelvis W Wo Contrast         Plan:  Lower abdominal pain  -     X-Ray Abdomen Flat And Erect; Future; Expected date: 08/07/2023  -      Hepatic Function Panel; Future; Expected date: 08/07/2023  -     RENAL FUNCTION PANEL; Future; Expected date: 08/07/2023  -     CBC Auto Differential; Future; Expected date: 08/07/2023  -     Urinalysis, Reflex to Urine Culture Urine, Clean Catch; Future; Expected date: 08/07/2023  -     CT Abdomen Pelvis W Wo Contrast; Future; Expected date: 08/07/2023    Thyroid function test abnormal      -Patient's lab results were reviewed and discussed with patient  -Treatment options and alternatives were discussed with the patient. Patient expressed understanding. Patient was given the opportunity to ask questions and be an active participant in their medical care. Patient had no further questions or concerns at this time.   -Documentation of patient's health and condition was obtained from family member who was present during visit.  -Patient is an overall moderate risk for health complications from their medical conditions.       Follow up: Follow up in about 1 week (around 8/14/2023), or sooner depending on test results..      After visit summary printed and given to patient upon discharge.  Patient goals and care plan are included in After visit summary.    Total medical decision making time was 49 min.  The following issues were discussed: The primary encounter diagnosis was Lower abdominal pain. A diagnosis of Thyroid function test abnormal was also pertinent to this visit.    Health maintenance needs, recent test results and goals of care discussed with pt and questions answered.

## 2023-08-07 NOTE — TELEPHONE ENCOUNTER
Called patient and notified of the results and recommendations. Patient verbalized understanding, appt made for 8/8/23 with Dr. Kuhn at 1:00, pt verbalized understanding.

## 2023-08-07 NOTE — TELEPHONE ENCOUNTER
----- Message from Lucero Hurtado NP sent at 8/7/2023  2:45 PM CDT -----  Small aneurysm likely not causing symptoms. Refer to GI for further evaluation of abdominal pain with diarrhea and wt loss.

## 2023-08-08 ENCOUNTER — OFFICE VISIT (OUTPATIENT)
Dept: GASTROENTEROLOGY | Facility: CLINIC | Age: 68
End: 2023-08-08

## 2023-08-08 ENCOUNTER — TELEPHONE (OUTPATIENT)
Dept: PRIMARY CARE CLINIC | Facility: CLINIC | Age: 68
End: 2023-08-08

## 2023-08-08 ENCOUNTER — HOSPITAL ENCOUNTER (OUTPATIENT)
Dept: RADIOLOGY | Facility: HOSPITAL | Age: 68
Discharge: HOME | End: 2023-08-08
Attending: NURSE PRACTITIONER

## 2023-08-08 VITALS
BODY MASS INDEX: 33.95 KG/M2 | SYSTOLIC BLOOD PRESSURE: 138 MMHG | HEIGHT: 62 IN | WEIGHT: 184.5 LBS | DIASTOLIC BLOOD PRESSURE: 82 MMHG | HEART RATE: 96 BPM

## 2023-08-08 DIAGNOSIS — R68.81 EARLY SATIETY: ICD-10-CM

## 2023-08-08 DIAGNOSIS — R63.4 WEIGHT LOSS: ICD-10-CM

## 2023-08-08 DIAGNOSIS — Z12.11 COLON CANCER SCREENING: Primary | ICD-10-CM

## 2023-08-08 DIAGNOSIS — R10.30 LOWER ABDOMINAL PAIN: ICD-10-CM

## 2023-08-08 PROCEDURE — 74019 RADEX ABDOMEN 2 VIEWS: CPT | Mod: 26,,, | Performed by: RADIOLOGY

## 2023-08-08 PROCEDURE — 99204 OFFICE O/P NEW MOD 45 MIN: CPT | Mod: S$PBB,,, | Performed by: INTERNAL MEDICINE

## 2023-08-08 PROCEDURE — 99204 PR OFFICE/OUTPT VISIT, NEW, LEVL IV, 45-59 MIN: ICD-10-PCS | Mod: S$PBB,,, | Performed by: INTERNAL MEDICINE

## 2023-08-08 PROCEDURE — 99999 PR PBB SHADOW E&M-EST. PATIENT-LVL V: ICD-10-PCS | Mod: PBBFAC,,, | Performed by: INTERNAL MEDICINE

## 2023-08-08 PROCEDURE — 99215 OFFICE O/P EST HI 40 MIN: CPT | Mod: PBBFAC | Performed by: INTERNAL MEDICINE

## 2023-08-08 PROCEDURE — 99999 PR PBB SHADOW E&M-EST. PATIENT-LVL V: CPT | Mod: PBBFAC,,, | Performed by: INTERNAL MEDICINE

## 2023-08-08 PROCEDURE — 74019 RADEX ABDOMEN 2 VIEWS: CPT | Mod: TC

## 2023-08-08 PROCEDURE — 74019 XR ABDOMEN FLAT AND ERECT: ICD-10-PCS | Mod: 26,,, | Performed by: RADIOLOGY

## 2023-08-08 RX ORDER — CELECOXIB 100 MG/1
100 CAPSULE ORAL 2 TIMES DAILY PRN
COMMUNITY
Start: 2023-05-16

## 2023-08-08 NOTE — ASSESSMENT & PLAN NOTE
Plan:   -Denies tobacco use   -Never had a mammogram   -Unsure of last pap smear   -PCP will need to order MMG and pap smear   -Will order colonoscopy   -CTAP did not reveal any acute abnormalities

## 2023-08-08 NOTE — PROGRESS NOTES
Clinic Consult:  [facility]ation [facility]    Reason for Consult:  The primary encounter diagnosis was Colon cancer screening. Diagnoses of Lower abdominal pain, Early satiety, and Weight loss were also pertinent to this visit.    PCP: Natalie Guzmán   [id number] / [place] [id number]    HPI:  This is a 68 y.o. female here for evaluation of abdominal pain.   She experiences early satiety.   She lost 16-17 pounds in 1-2 months unintentionally.   The pain is in the lower abdomen.   She describes it as a bloating sensation.   This has been occurring for 3-4 months.   Worsening in the last month.   Never had a colonoscopy.     Denies vomiting, hematemesis, constipation.   Bowel movements change with food availability on ship.     Had a CTAP yesterday that revealed no acute findings identified.        ROS:  As above HPI       Medical History:   Past Medical History:   Diagnosis Date    Hypertension        Surgical History:  Past Surgical History:   Procedure Laterality Date     SECTION         Family History:   Family History   Problem Relation Age of Onset    Hypertension Mother         with CVA    Cancer Mother         breast    Heart disease Father         heavy smoker       Social History:   Social History     Tobacco Use    Smoking status: Never    Smokeless tobacco: Never   Substance Use Topics    Alcohol use: Not Currently     Alcohol/week: 2.0 standard drinks of alcohol     Types: 2 Shots of liquor per week    Drug use: Never       Allergies: Reviewed    Home Medications:   Medication List with Changes/Refills   Current Medications    ATORVASTATIN (LIPITOR) 40 MG TABLET    TAKE 1 TABLET BY MOUTH ONCE DAILY IN THE EVENING    CELECOXIB (CELEBREX) 100 MG CAPSULE    Take 100 mg by mouth 2 (two) times daily as needed.    CHLORTHALIDONE (HYGROTEN) 25 MG TAB    Take 1 tablet (25 mg total) by mouth once daily.    COENZYME Q10 200 MG CAPSULE    Take 200 mg by mouth once daily.  "   GABAPENTIN (NEURONTIN) 100 MG CAPSULE    Take 1 capsule (100 mg total) by mouth 2 (two) times daily as needed (take in morning and afternoon as needed for nerve pain).         Physical Exam:  Vital Signs:  /82   Pulse 96   Ht 5' 2" (1.575 m)   Wt 83.7 kg (184 lb 8.4 oz)   BMI 33.75 kg/m²   Body mass index is 33.75 kg/m².    Physical Exam  Constitutional:       Appearance: Normal appearance.   HENT:      Head: Normocephalic.      Mouth/Throat:      Mouth: Mucous membranes are moist.      Pharynx: Oropharynx is clear.   Eyes:      Conjunctiva/sclera: Conjunctivae normal.   Pulmonary:      Effort: Pulmonary effort is normal.   Abdominal:      General: There is no distension.      Palpations: Abdomen is soft.      Tenderness: There is no abdominal tenderness. There is no guarding.   Neurological:      Mental Status: She is alert.          Labs: Pertinent labs reviewed.      Assessment:  Problem List Items Addressed This Visit          Endocrine    Weight loss    Current Assessment & Plan     Plan:   -Denies tobacco use   -Never had a mammogram   -Unsure of last pap smear   -PCP will need to order MMG and pap smear   -Will order colonoscopy   -CTAP did not reveal any acute abnormalities             GI    Lower abdominal pain    Current Assessment & Plan     Plan:   CTAP results above   Colonoscopy ordered   Abdominal xray ordered             Other    Early satiety    Current Assessment & Plan     Plan:   -Gastric emptying study ordered           Other Visit Diagnoses       Colon cancer screening    -  Primary          Colon cancer screening  -     Ambulatory referral/consult to Endo Procedure ; Future; Expected date: 08/09/2023    Lower abdominal pain  -     Ambulatory referral/consult to Gastroenterology    Early satiety  -     NM Gastric Emptying; Future; Expected date: 08/08/2023    Weight loss         Follow-up after diagnostic evaluation.     Order summary:  Orders Placed This Encounter "   Procedures    NM Gastric Emptying    Ambulatory referral/consult to Endo Procedure        Thank you so much for allowing me to participate in the care of Patience Kuhn MD  Gastroenterology and Hepatology  [facility]

## 2023-08-08 NOTE — PATIENT INSTRUCTIONS
Source: [email or web address]

## 2023-08-09 ENCOUNTER — HOSPITAL ENCOUNTER (OUTPATIENT)
Dept: PREADMISSION TESTING | Facility: HOSPITAL | Age: 68
Discharge: HOME | End: 2023-08-09
Attending: INTERNAL MEDICINE

## 2023-08-09 DIAGNOSIS — Z12.11 COLON CANCER SCREENING: ICD-10-CM

## 2023-08-24 RX ORDER — ATORVASTATIN CALCIUM 40 MG/1
TABLET, FILM COATED ORAL
Qty: 90 TABLET | Refills: 0 | Status: SHIPPED | OUTPATIENT
Start: 2023-08-24 | End: 2023-11-21

## 2023-11-21 RX ORDER — ATORVASTATIN CALCIUM 40 MG/1
TABLET, FILM COATED ORAL
Qty: 90 TABLET | Refills: 0 | Status: SHIPPED | OUTPATIENT
Start: 2023-11-21 | End: 2024-02-19

## 2023-11-28 RX ORDER — CHLORTHALIDONE 25 MG/1
25 TABLET ORAL DAILY
Qty: 30 TABLET | Refills: 11 | Status: SHIPPED | OUTPATIENT
Start: 2023-11-28 | End: 2024-11-27

## 2024-02-19 RX ORDER — ATORVASTATIN CALCIUM 40 MG/1
TABLET, FILM COATED ORAL
Qty: 90 TABLET | Refills: 0 | Status: SHIPPED | OUTPATIENT
Start: 2024-02-19 | End: 2024-05-16

## 2024-02-28 DIAGNOSIS — Z12.31 OTHER SCREENING MAMMOGRAM: ICD-10-CM

## 2024-02-29 ENCOUNTER — TELEPHONE (OUTPATIENT)
Dept: PRIMARY CARE CLINIC | Facility: CLINIC | Age: 69
End: 2024-02-29

## 2024-02-29 NOTE — TELEPHONE ENCOUNTER
----- Message from Natalie Guzmán MD sent at 2/29/2024 11:39 AM CST -----  Regarding: needs appt  I haven't seen this pt since Nov 2022  Last visit judith Barker August 2023  Needs appt judith me and for EAWV judith Barker or Rayna

## 2024-05-16 RX ORDER — ATORVASTATIN CALCIUM 40 MG/1
TABLET, FILM COATED ORAL
Qty: 30 TABLET | Refills: 0 | Status: SHIPPED | OUTPATIENT
Start: 2024-05-16 | End: 2024-06-10

## 2024-05-24 ENCOUNTER — OFFICE VISIT (OUTPATIENT)
Dept: PRIMARY CARE CLINIC | Facility: CLINIC | Age: 69
End: 2024-05-24

## 2024-05-24 VITALS
HEIGHT: 62 IN | OXYGEN SATURATION: 97 % | DIASTOLIC BLOOD PRESSURE: 68 MMHG | TEMPERATURE: 98 F | SYSTOLIC BLOOD PRESSURE: 124 MMHG | BODY MASS INDEX: 33.51 KG/M2 | HEART RATE: 98 BPM | WEIGHT: 182.13 LBS

## 2024-05-24 DIAGNOSIS — M16.0 OSTEOARTHRITIS OF BOTH HIPS, UNSPECIFIED OSTEOARTHRITIS TYPE: Chronic | ICD-10-CM

## 2024-05-24 DIAGNOSIS — Z78.0 POST-MENOPAUSAL: ICD-10-CM

## 2024-05-24 DIAGNOSIS — E78.5 DYSLIPIDEMIA: Primary | Chronic | ICD-10-CM

## 2024-05-24 DIAGNOSIS — G62.9 NEUROPATHY: Chronic | ICD-10-CM

## 2024-05-24 DIAGNOSIS — D64.9 ANEMIA, UNSPECIFIED TYPE: ICD-10-CM

## 2024-05-24 DIAGNOSIS — I10 PRIMARY HYPERTENSION: Chronic | ICD-10-CM

## 2024-05-24 DIAGNOSIS — R94.6 THYROID FUNCTION TEST ABNORMAL: ICD-10-CM

## 2024-05-24 DIAGNOSIS — H61.22 LEFT EAR IMPACTED CERUMEN: ICD-10-CM

## 2024-05-24 DIAGNOSIS — R73.9 HYPERGLYCEMIA: ICD-10-CM

## 2024-05-24 DIAGNOSIS — E03.9 ACQUIRED HYPOTHYROIDISM: ICD-10-CM

## 2024-05-24 DIAGNOSIS — E66.9 CLASS 1 OBESITY WITH BODY MASS INDEX (BMI) OF 33.0 TO 33.9 IN ADULT, UNSPECIFIED OBESITY TYPE, UNSPECIFIED WHETHER SERIOUS COMORBIDITY PRESENT: ICD-10-CM

## 2024-05-24 DIAGNOSIS — Z12.31 ENCOUNTER FOR SCREENING MAMMOGRAM FOR MALIGNANT NEOPLASM OF BREAST: ICD-10-CM

## 2024-05-24 DIAGNOSIS — E53.8 B12 DEFICIENCY: ICD-10-CM

## 2024-05-24 DIAGNOSIS — E55.9 VITAMIN D INSUFFICIENCY: ICD-10-CM

## 2024-05-24 PROBLEM — R10.30 LOWER ABDOMINAL PAIN: Status: RESOLVED | Noted: 2023-08-07 | Resolved: 2024-05-24

## 2024-05-24 PROBLEM — R68.81 EARLY SATIETY: Status: RESOLVED | Noted: 2023-08-08 | Resolved: 2024-05-24

## 2024-05-24 PROBLEM — E66.811 CLASS 1 OBESITY WITH BODY MASS INDEX (BMI) OF 33.0 TO 33.9 IN ADULT: Status: ACTIVE | Noted: 2024-05-24

## 2024-05-24 PROCEDURE — 84439 ASSAY OF FREE THYROXINE: CPT | Performed by: INTERNAL MEDICINE

## 2024-05-24 PROCEDURE — 83036 HEMOGLOBIN GLYCOSYLATED A1C: CPT | Performed by: INTERNAL MEDICINE

## 2024-05-24 PROCEDURE — 99215 OFFICE O/P EST HI 40 MIN: CPT | Mod: S$PBB,,, | Performed by: INTERNAL MEDICINE

## 2024-05-24 PROCEDURE — 99999 PR PBB SHADOW E&M-EST. PATIENT-LVL IV: CPT | Mod: PBBFAC,,, | Performed by: INTERNAL MEDICINE

## 2024-05-24 PROCEDURE — 99214 OFFICE O/P EST MOD 30 MIN: CPT | Mod: PBBFAC,PN | Performed by: INTERNAL MEDICINE

## 2024-05-24 PROCEDURE — 84443 ASSAY THYROID STIM HORMONE: CPT | Performed by: INTERNAL MEDICINE

## 2024-05-24 PROCEDURE — 82607 VITAMIN B-12: CPT | Performed by: INTERNAL MEDICINE

## 2024-05-24 PROCEDURE — 80053 COMPREHEN METABOLIC PANEL: CPT | Performed by: INTERNAL MEDICINE

## 2024-05-24 PROCEDURE — 82306 VITAMIN D 25 HYDROXY: CPT | Performed by: INTERNAL MEDICINE

## 2024-05-24 PROCEDURE — 85025 COMPLETE CBC W/AUTO DIFF WBC: CPT | Performed by: INTERNAL MEDICINE

## 2024-05-24 PROCEDURE — 80061 LIPID PANEL: CPT | Performed by: INTERNAL MEDICINE

## 2024-05-24 PROCEDURE — 83735 ASSAY OF MAGNESIUM: CPT | Performed by: INTERNAL MEDICINE

## 2024-05-24 RX ORDER — IBUPROFEN 800 MG/1
800 TABLET ORAL EVERY 6 HOURS PRN
COMMUNITY

## 2024-05-24 RX ORDER — CYCLOBENZAPRINE HCL 10 MG
10 TABLET ORAL NIGHTLY
Qty: 30 TABLET | Refills: 2 | Status: SHIPPED | OUTPATIENT
Start: 2024-05-24 | End: 2024-08-22

## 2024-05-24 RX ORDER — ACETAMINOPHEN 160 MG/5ML
200 SUSPENSION, ORAL (FINAL DOSE FORM) ORAL DAILY
Qty: 90 CAPSULE | Refills: 1 | Status: SHIPPED | OUTPATIENT
Start: 2024-05-24 | End: 2024-11-20

## 2024-05-24 NOTE — PROGRESS NOTES
Patience Craft  05/24/2024  [id number]    Natalie Guzmán MD  Patient Care Team:  Natalie Guzmán MD as PCP - General (Internal Medicine)  Bryant, Lucero SALAS NP as Nurse Practitioner (Family Medicine)    Visit Type: Follow-up    Chief Complaint:  Chief Complaint   Patient presents with    Follow-up     Pt is complaining of left hip pain. Tingling in right arm, tingling on right side.      History of Present Illness: Ms. Craft is a 69 year old female here for scheduled f/u. 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 Currently only has Medicare Part  but Part B will be active starting sometime in July. She prefers, therefore to defer testing that might have co-pay/deductible until later this Summer or Fall.    C/o R arm and R leg neuropathy and R leg stiffness  C/o R leg swelling  C/o B inguinal pain when lifting legs against pressure  L hip pain affects gait - also w L lateral lower leg pain  [facility] and has HEP that helps    GI issues have resolved since home from [place] - she thinks was mostly due to stress of work compounded by less healthy meal options, disrupted sleep accompanying that work     Still c/o excessive sweating - worse w exertion such as going up stairs - also w night sweats   C/o intermittent burning sensation of tongue and sometimes lips   Has not noted specific trigger(s) for this    From last visit w me 11/14/22  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   Pt w Medicare part A insurance only so POC limited by financial constraints and pt deferring some recommended health maintenance until she has further insurance coverage.   L hip/groin and midline buttock (base of spine) pain  Doing stretches that's helping.  C/o swelling R leg when up on feet. Compression socks helping.  Has tried taking celebrex BID per rec of Lucero Bryant which has helped.  Intermittent dizziness when  lying down.  Gabapentin helped but sedating.  Decreased appetite - unintentional weight loss - increased diaphoresis worsening over past 6 mos.    Recent appointments:   Telephone conversation w [employer] consultant in [place] while offshore  8/08/23 [facility]  8/07/23 [facility]     Upcoming appointments:  Future Appointments       Date Provider Specialty Appt Notes    5/29/2024 Meghana Marie MA Outpatient Rehab new wants orientation    8/8/2024 Rayna Sharma NP Primary Care AWV    8/26/2024 Natalie Guzmán MD Primary Care Three month f/u    8/27/2024  Radiology Encounter for screening mammogram for malignant neoplasm of breast [Z12.31]    8/27/2024  Radiology Post-menopausal [Z78.0]          The following were reviewed: Active problem list, medication list, allergies, family history, social history, and Health Maintenance.     Medications have been reviewed and reconciled with patient at visit today.    Review of Systems   See HPI above    Exam: sat 97%  Vitals:    05/24/24 0853   BP: 124/68   Pulse: 98   Temp: 98 °F (36.7 °C)     Weight: 82.6 kg (182 lb 1.6 oz)   Body mass index is 33.31 kg/m².    BP Readings from Last 3 Encounters:   05/24/24 124/68   08/08/23 138/82   08/07/23 128/78      Wt Readings from Last 3 Encounters:   05/24/24 0853 82.6 kg (182 lb 1.6 oz)   08/08/23 1257 83.7 kg (184 lb 8.4 oz)   08/07/23 0820 83 kg (183 lb)        Physical Exam  Vitals reviewed.   Constitutional:       General: She is not in acute distress.     Appearance: Normal appearance. She is obese. She is not diaphoretic.   HENT:      Head: Normocephalic and atraumatic.      Right Ear: External ear normal.      Left Ear: External ear normal.      Nose: Nose normal.      Mouth/Throat:      Mouth: Mucous membranes are moist.      Pharynx: Oropharynx is clear.   Eyes:      Extraocular Movements: Extraocular movements intact.      Conjunctiva/sclera: Conjunctivae normal.   Cardiovascular:      Rate and Rhythm:  "Normal rate and regular rhythm.      Comments: Borderline tachycardia  Pulmonary:      Effort: Pulmonary effort is normal. No respiratory distress.      Breath sounds: No wheezing, rhonchi or rales.   Abdominal:      General: Bowel sounds are normal.      Palpations: Abdomen is soft.      Tenderness: There is no abdominal tenderness. There is no guarding.   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   Neurological:      Mental Status: She is alert. Mental status is at baseline.      Gait: Gait abnormal.   Psychiatric:         Mood and Affect: Mood normal.         Behavior: Behavior normal.         Thought Content: Thought content normal.         Judgment: Judgment normal.        Laboratory Reviewed  Lab Results   Component Value Date    WBC 9.00 05/24/2024    HGB 14.1 05/24/2024    HCT 43.2 05/24/2024     05/24/2024    MCV 96 05/24/2024    CHOL 178 05/24/2024    TRIG 105 05/24/2024    HDL 46 05/24/2024    LDLCALC 111.0 05/24/2024    ALT 25 05/24/2024    AST 19 05/24/2024     05/24/2024    K 4.0 05/24/2024    CL 99 05/24/2024    CREATININE 0.8 05/24/2024    BUN 15 05/24/2024    CO2 30 (H) 05/24/2024    MG 1.8 05/24/2024    TSH 5.111 (H) 05/24/2024    FREET4 0.89 05/24/2024    HGBA1C 5.2 05/24/2024     Lab Results   Component Value Date    CALCIUM 10.9 (H) 05/24/2024    PHOS 3.6 08/07/2023      Lab Results   Component Value Date    ZDJGYQUC77 1343 (H) 05/24/2024   No results found for: "FOLATE" No results found for: "UIBC", "IRON", "TRANS", "TRANSFERRIN", "TIBC", "LABIRON", "FESATURATED"   Lab Results   Component Value Date    EGFRNORACEVR >60.0 05/24/2024    ALBUMIN 4.1 05/24/2024     Lab Results   Component Value Date    KDREHUMV67FM 50 05/24/2024        CTAP 8/07/23 2.6 cm infrarenal abdominal aortic aneurysm.  Aortic and branch structure " atherosclerosis.  1.8 cm duodenal diverticulum.  Colonic diverticulosis.  No evidence of bowel obstruction.       Assessment:   69 y.o. female with multiple co-morbid illnesses here for continued follow up of medical problems.      The primary encounter diagnosis was Dyslipidemia. Diagnoses of Thyroid function test abnormal, Primary hypertension, Osteoarthritis of both hips, unspecified osteoarthritis type, Vitamin D insufficiency, B12 deficiency, Acquired hypothyroidism, Anemia, unspecified type, Class 1 obesity with body mass index (BMI) of 33.0 to 33.9 in adult, unspecified obesity type, unspecified whether serious comorbidity present, Hyperglycemia, Encounter for screening mammogram for malignant neoplasm of breast, Post-menopausal, Neuropathy, and Left ear impacted cerumen were also pertinent to this visit.      Plan:   1. Dyslipidemia  -     Lipid Panel; Future; Expected date: 05/24/2024    2. Thyroid function test abnormal  -     Hemoglobin A1C; Future; Expected date: 05/24/2024    3. Primary hypertension  -     Magnesium; Future; Expected date: 05/24/2024  -     Comprehensive Metabolic Panel; Future; Expected date: 05/24/2024    4. Osteoarthritis of both hips, unspecified osteoarthritis type  Overview:  Xray B hips 10/2022  No acute osseous abnormality.  Prominent degenerative findings of the left hip noted including joint space loss, subcortical sclerosis and osteophyte formation.  Minimal right hip degenerative findings.  Moderate to severe degenerative findings of the lower lumbar spine.  Pubic symphysis degenerative findings noted.  Soft tissues unremarkable.  Impression: Degenerative findings most prevalent at the lumbar spine and left hip.    Assessment & Plan:  Ok to take ibuprofen up to 600 mg (advised 800 mg unlikely to be more effective) - cont work w [facility], HEP - referral [facility] for orientation to [facility] - consider PT?       5. Vitamin D insufficiency  -     Vitamin D;  Future; Expected date: 05/24/2024    6. B12 deficiency  -     Vitamin B12; Future; Expected date: 05/24/2024    7. Acquired hypothyroidism  -     T4, Free; Future; Expected date: 05/24/2024  -     TSH; Future; Expected date: 05/24/2024    8. Anemia, unspecified type  -     CBC Auto Differential; Future; Expected date: 05/24/2024    9. Class 1 obesity with body mass index (BMI) of 33.0 to 33.9 in adult, unspecified obesity type, unspecified whether serious comorbidity present  -     Hemoglobin A1C; Future; Expected date: 05/24/2024    10. Hyperglycemia  -     Hemoglobin A1C; Future; Expected date: 05/24/2024    11. Encounter for screening mammogram for malignant neoplasm of breast  -     Mammo Digital Screening Bilat; Future; Expected date: 08/01/2024    12. Post-menopausal  -     DXA Bone Density Axial Skeleton 1 or more sites; Future; Expected date: 08/01/2024    13. Neuropathy  Overview:  Xray L-Spine 10/2022  Mild levo scoliotic curvature.  Vertebral body heights maintained.  No spondylolisthesis.  Multilevel osteophyte changes present with mild multilevel disc space narrowing most prevalent L5-S1 and L3-4.  Lower lumbar spine facet arthropathy noted without definite pars defect.  Soft tissues unremarkable.  Impression: Degenerative findings    Assessment & Plan:  Low dose gabapentin too sedating - cyclobenzaprine QHS helps - cont work w [facility], HEP - referral [facility] for orientation to [facility] - consider PT? - consider MRI C-spine, L-Spine?      14. Left ear impacted cerumen  Assessment & Plan:  Debrox or other earwax remover to L ear per package instructions  Call for nurse visit on completion to check ear and possible earwash      Other orders  -     cyclobenzaprine (FLEXERIL) 10 MG tablet; Take 1 tablet (10 mg total) by mouth every evening.  Dispense: 30 tablet; Refill: 2  -     coenzyme Q10 200 mg capsule; Take 200 mg by mouth once daily.  Dispense: 90 capsule; Refill: 1         Health  Maintenance         Date Due Completion Date    TETANUS VACCINE Never done ---    Mammogram Never done ---    DEXA Scan Never done ---    Colorectal Cancer Screening Never done ---    Shingles Vaccine (1 of 2) Never done ---    RSV Vaccine (Age 60+ and Pregnant patients) (1 - 1-dose 60+ series) Never done ---    Pneumococcal Vaccines (Age 65+) (1 of 1 - PCV) Never done ---    COVID-19 Vaccine (2 - 2023-24 season) 09/01/2023 11/30/2021    Influenza Vaccine (Season Ended) 09/01/2024 9/21/2022 (Done)    Override on 9/21/2022: [place]    Lipid Panel 05/24/2029 5/24/2024            -Patient's lab results were reviewed and discussed with patient  -Treatment options and alternatives were discussed with the patient. Patient expressed understanding. Patient was given the opportunity to ask questions and be an active participant in their medical care. Patient had no further questions or concerns at this time.   -Patient is an overall moderate risk for health complications from their medical conditions.     Follow up: Follow up in about 3 months (around 8/24/2024) for Follow Up w me.  And for EAWV 2-4 weeks prior to next f/u w me.    After visit summary printed and given to patient upon discharge.  Patient care plan included in After visit summary.    TOTAL TIME evaluating and managing this patient for this encounter was greater than 60 minutes. This time was spent personally by me on some of the following activities: review of patient's past medical history, assessing age-appropriate health maintenance needs, review of any interval history, review and interpretation of lab results, review and interpretation of imaging test results, review and interpretation of cardiology test results, reviewing consulting specialist notes, obtaining history from the patient and family, examination of the patient, medication reconciliation, managing and/or ordering prescription medications, ordering imaging tests, ordering referral to  subspecialty provider(s), educating patient and answering their questions about diagnosis, treatment plan, and goals of treatment, discussing planned follow-up and final documentation of the visit. This time was exclusive of any separately billable procedures for this patient and exclusive of time spent treating any other patients.

## 2024-05-24 NOTE — PATIENT INSTRUCTIONS
If you are feeling unwell, we'd like to be the first ones to know here at [facility]! Please give us a call. Same day appointments are our top priority to keep you well and out of the emergency rooms and hospitals. Call [phone] for our direct line. After hours advice is always available. Please call [phone] after hours to speak to the on-call team.      Recommend  Tetanus, Covid and RSV vaccines that can be scheduled at your pharmacy of choice if not already done    Recommend Pneumonia and Shingles vaccines that can be scheduled in the office here or at your pharmacy of choice if not already done    Please provide copy of immunization record    Work on increasing water intake in first 10 hours of the day  Pause and use caution when moving from lying or sitting to stand     Let us know if interested in eval w [facility] PT    Ok to take ibuprofen up to 600 mg in moderation    Debrox or other earwax remover to L ear per package instructions  Call for nurse visit on completion to check ear and possible earwash

## 2024-05-25 LAB
25(OH)D3+25(OH)D2 SERPL-MCNC: 50 NG/ML (ref 30–96)
ALBUMIN SERPL BCP-MCNC: 4.1 G/DL (ref 3.5–5.2)
ALP SERPL-CCNC: 82 U/L (ref 55–135)
ALT SERPL W/O P-5'-P-CCNC: 25 U/L (ref 10–44)
ANION GAP SERPL CALC-SCNC: 12 MMOL/L (ref 8–16)
AST SERPL-CCNC: 19 U/L (ref 10–40)
BASOPHILS # BLD AUTO: 0.04 K/UL (ref 0–0.2)
BASOPHILS NFR BLD: 0.4 % (ref 0–1.9)
BILIRUB SERPL-MCNC: 0.7 MG/DL (ref 0.1–1)
BUN SERPL-MCNC: 15 MG/DL (ref 8–23)
CALCIUM SERPL-MCNC: 10.9 MG/DL (ref 8.7–10.5)
CHLORIDE SERPL-SCNC: 99 MMOL/L (ref 95–110)
CHOLEST SERPL-MCNC: 178 MG/DL (ref 120–199)
CHOLEST/HDLC SERPL: 3.9 {RATIO} (ref 2–5)
CO2 SERPL-SCNC: 30 MMOL/L (ref 23–29)
CREAT SERPL-MCNC: 0.8 MG/DL (ref 0.5–1.4)
DIFFERENTIAL METHOD BLD: ABNORMAL
EOSINOPHIL # BLD AUTO: 0.2 K/UL (ref 0–0.5)
EOSINOPHIL NFR BLD: 2 % (ref 0–8)
ERYTHROCYTE [DISTWIDTH] IN BLOOD BY AUTOMATED COUNT: 15.1 % (ref 11.5–14.5)
EST. GFR  (NO RACE VARIABLE): >60 ML/MIN/1.73 M^2
ESTIMATED AVG GLUCOSE: 103 MG/DL (ref 68–131)
GLUCOSE SERPL-MCNC: 88 MG/DL (ref 70–110)
HBA1C MFR BLD: 5.2 % (ref 4–5.6)
HCT VFR BLD AUTO: 43.2 % (ref 37–48.5)
HDLC SERPL-MCNC: 46 MG/DL (ref 40–75)
HDLC SERPL: 25.8 % (ref 20–50)
HGB BLD-MCNC: 14.1 G/DL (ref 12–16)
IMM GRANULOCYTES # BLD AUTO: 0.02 K/UL (ref 0–0.04)
IMM GRANULOCYTES NFR BLD AUTO: 0.2 % (ref 0–0.5)
LDLC SERPL CALC-MCNC: 111 MG/DL (ref 63–159)
LYMPHOCYTES # BLD AUTO: 2 K/UL (ref 1–4.8)
LYMPHOCYTES NFR BLD: 22.4 % (ref 18–48)
MAGNESIUM SERPL-MCNC: 1.8 MG/DL (ref 1.6–2.6)
MCH RBC QN AUTO: 31.2 PG (ref 27–31)
MCHC RBC AUTO-ENTMCNC: 32.6 G/DL (ref 32–36)
MCV RBC AUTO: 96 FL (ref 82–98)
MONOCYTES # BLD AUTO: 0.6 K/UL (ref 0.3–1)
MONOCYTES NFR BLD: 6.7 % (ref 4–15)
NEUTROPHILS # BLD AUTO: 6.1 K/UL (ref 1.8–7.7)
NEUTROPHILS NFR BLD: 68.3 % (ref 38–73)
NONHDLC SERPL-MCNC: 132 MG/DL
NRBC BLD-RTO: 0 /100 WBC
PLATELET # BLD AUTO: 314 K/UL (ref 150–450)
PMV BLD AUTO: 9.8 FL (ref 9.2–12.9)
POTASSIUM SERPL-SCNC: 4 MMOL/L (ref 3.5–5.1)
PROT SERPL-MCNC: 7.3 G/DL (ref 6–8.4)
RBC # BLD AUTO: 4.52 M/UL (ref 4–5.4)
SODIUM SERPL-SCNC: 141 MMOL/L (ref 136–145)
T4 FREE SERPL-MCNC: 0.89 NG/DL (ref 0.71–1.51)
TRIGL SERPL-MCNC: 105 MG/DL (ref 30–150)
TSH SERPL DL<=0.005 MIU/L-ACNC: 5.11 UIU/ML (ref 0.4–4)
VIT B12 SERPL-MCNC: 1343 PG/ML (ref 210–950)
WBC # BLD AUTO: 9 K/UL (ref 3.9–12.7)

## 2024-05-25 NOTE — ASSESSMENT & PLAN NOTE
Debrox or other earwax remover to L ear per package instructions  Call for nurse visit on completion to check ear and possible earwash

## 2024-05-25 NOTE — ASSESSMENT & PLAN NOTE
Ok to take ibuprofen up to 600 mg (advised 800 mg unlikely to be more effective) - cont work w [facility], HEP - referral [facility] for orientation to [facility] - consider PT?

## 2024-05-25 NOTE — PROGRESS NOTES
Pt does not use [facility] pt portal - please call w message below:    -B12 level is a bit elevated - consider decrease B12 dose or taking every other day.  Will order methylmalonic acid level for next labs to double check that the B12 level is not falsely elevated.    -TSH is a little above normal but freeT4 level is normal - similar to last year.  We may want to check T3 level with next routine labs - recommend to cont monitoring for now.    -Calcium level is elevated. Are you taking supplemental calcium or a calcium based antiacid like TUMs on a regular basis? If so, please HOLD.   Sometimes diuretics like chlorthalidone (or hydrochlorathizide) can cause calcium levels to go up.  If NOT taking calcium supplement, recommend to HOLD chlorthalidone.   We'll want to recheck calcium (and phos) at some point too.    -Lipid levels are up a bit. Had you been without the atorvastatin for a while?  Let me know if want to continue atorvastatin at current dose or consider increasing or adding ezetimibe    -A1c level is good  - vit D level is good

## 2024-05-25 NOTE — ASSESSMENT & PLAN NOTE
Low dose gabapentin too sedating - cyclobenzaprine QHS helps - cont work w [facility], HEP - referral [facility] for orientation to [facility] - consider PT? - consider MRI C-spine, L-Spine?

## 2024-05-27 ENCOUNTER — TELEPHONE (OUTPATIENT)
Dept: PRIMARY CARE CLINIC | Facility: CLINIC | Age: 69
End: 2024-05-27

## 2024-05-27 RX ORDER — EZETIMIBE 10 MG/1
10 TABLET ORAL DAILY
Qty: 90 TABLET | Refills: 3 | Status: SHIPPED | OUTPATIENT
Start: 2024-05-27 | End: 2025-05-27

## 2024-05-27 NOTE — TELEPHONE ENCOUNTER
Spoke with pt and gave lab results and instructions. Pt okay with adding Zetia.    SJ    ----- Message from Natalie Guzmán MD sent at 5/25/2024  6:18 PM CDT -----  Pt does not use [facility] pt portal - please call w message below:    -B12 level is a bit elevated - consider decrease B12 dose or taking every other day.  Will order methylmalonic acid level for next labs to double check that the B12 level is not falsely elevated.    -TSH is a little above normal but freeT4 level is normal - similar to last year.  We may want to check T3 level with next routine labs - recommend to cont monitoring for now.    -Calcium level is elevated. Are you taking supplemental calcium or a calcium based antiacid like TUMs on a regular basis? If so, please HOLD.   Sometimes diuretics like chlorthalidone (or hydrochlorathizide) can cause calcium levels to go up.  If NOT taking calcium supplement, recommend to HOLD chlorthalidone.   We'll want to recheck calcium (and phos) at some point too.    -Lipid levels are up a bit. Had you been without the atorvastatin for a while?  Let me know if want to continue atorvastatin at current dose or consider increasing or adding ezetimibe    -A1c level is good  - vit D level is good

## 2024-05-29 ENCOUNTER — DOCUMENTATION ONLY (OUTPATIENT)
Dept: REHABILITATION | Facility: HOSPITAL | Age: 69
End: 2024-05-29
Payer: MEDICARE

## 2024-06-10 RX ORDER — ATORVASTATIN CALCIUM 40 MG/1
TABLET, FILM COATED ORAL
Qty: 30 TABLET | Refills: 2 | Status: SHIPPED | OUTPATIENT
Start: 2024-06-10 | End: 2024-09-08

## 2024-08-02 ENCOUNTER — OFFICE VISIT (OUTPATIENT)
Dept: PRIMARY CARE CLINIC | Facility: CLINIC | Age: 69
End: 2024-08-02
Payer: MEDICARE

## 2024-08-02 VITALS
HEART RATE: 87 BPM | SYSTOLIC BLOOD PRESSURE: 122 MMHG | OXYGEN SATURATION: 97 % | BODY MASS INDEX: 33.31 KG/M2 | TEMPERATURE: 97 F | WEIGHT: 181 LBS | HEIGHT: 62 IN | DIASTOLIC BLOOD PRESSURE: 76 MMHG

## 2024-08-02 DIAGNOSIS — R07.9 CHEST PAIN ON EXERTION: Primary | ICD-10-CM

## 2024-08-02 PROCEDURE — 99999 PR PBB SHADOW E&M-EST. PATIENT-LVL IV: CPT | Mod: PBBFAC,,, | Performed by: FAMILY MEDICINE

## 2024-08-02 PROCEDURE — 99214 OFFICE O/P EST MOD 30 MIN: CPT | Mod: PBBFAC,PN | Performed by: FAMILY MEDICINE

## 2024-08-02 RX ORDER — NITROGLYCERIN 0.4 MG/1
0.4 TABLET SUBLINGUAL EVERY 5 MIN PRN
Qty: 30 TABLET | Refills: 0 | Status: SHIPPED | OUTPATIENT
Start: 2024-08-02

## 2024-08-02 NOTE — PATIENT INSTRUCTIONS
If you are feeling unwell, we'd like to be the first ones to know here at [facility]! Please give us a call. Same day appointments are our top priority to keep you well and out of the emergency rooms and hospitals. Call [phone] for our direct line. After hours advice is always available. Please call [phone] after hours to speak to the on-call team.

## 2024-08-07 ENCOUNTER — HOSPITAL ENCOUNTER (OUTPATIENT)
Dept: CARDIOLOGY | Facility: HOSPITAL | Age: 69
Discharge: HOME | End: 2024-08-07
Attending: FAMILY MEDICINE

## 2024-08-07 ENCOUNTER — TELEPHONE (OUTPATIENT)
Dept: PRIMARY CARE CLINIC | Facility: CLINIC | Age: 69
End: 2024-08-07

## 2024-08-07 VITALS — SYSTOLIC BLOOD PRESSURE: 140 MMHG | DIASTOLIC BLOOD PRESSURE: 75 MMHG | HEART RATE: 106 BPM

## 2024-08-07 DIAGNOSIS — R07.9 CHEST PAIN ON EXERTION: Primary | ICD-10-CM

## 2024-08-07 DIAGNOSIS — R07.9 CHEST PAIN ON EXERTION: ICD-10-CM

## 2024-08-12 ENCOUNTER — LAB VISIT (OUTPATIENT)
Dept: LAB | Facility: HOSPITAL | Age: 69
End: 2024-08-12
Attending: INTERNAL MEDICINE

## 2024-08-12 ENCOUNTER — OFFICE VISIT (OUTPATIENT)
Dept: CARDIOLOGY | Facility: CLINIC | Age: 69
End: 2024-08-12

## 2024-08-12 VITALS
HEIGHT: 62 IN | SYSTOLIC BLOOD PRESSURE: 147 MMHG | BODY MASS INDEX: 33.47 KG/M2 | WEIGHT: 181.88 LBS | DIASTOLIC BLOOD PRESSURE: 86 MMHG | HEART RATE: 86 BPM

## 2024-08-12 DIAGNOSIS — R61 EXCESSIVE SWEATING: ICD-10-CM

## 2024-08-12 DIAGNOSIS — R29.818 SUSPECTED SLEEP APNEA: ICD-10-CM

## 2024-08-12 DIAGNOSIS — R06.09 DOE (DYSPNEA ON EXERTION): Primary | ICD-10-CM

## 2024-08-12 DIAGNOSIS — I10 PRIMARY HYPERTENSION: ICD-10-CM

## 2024-08-12 DIAGNOSIS — R00.2 PALPITATIONS: ICD-10-CM

## 2024-08-12 DIAGNOSIS — R07.9 CHEST PAIN ON EXERTION: ICD-10-CM

## 2024-08-12 LAB
T4 FREE SERPL-MCNC: 0.77 NG/DL (ref 0.71–1.51)
TSH SERPL DL<=0.005 MIU/L-ACNC: 5.28 UIU/ML (ref 0.4–4)

## 2024-08-12 PROCEDURE — 99999 PR PBB SHADOW E&M-EST. PATIENT-LVL V: CPT | Mod: PBBFAC,,, | Performed by: INTERNAL MEDICINE

## 2024-08-12 PROCEDURE — 99215 OFFICE O/P EST HI 40 MIN: CPT | Mod: PBBFAC | Performed by: INTERNAL MEDICINE

## 2024-08-12 PROCEDURE — 84439 ASSAY OF FREE THYROXINE: CPT | Performed by: INTERNAL MEDICINE

## 2024-08-12 PROCEDURE — 99204 OFFICE O/P NEW MOD 45 MIN: CPT | Mod: S$PBB,,, | Performed by: INTERNAL MEDICINE

## 2024-08-12 PROCEDURE — 36415 COLL VENOUS BLD VENIPUNCTURE: CPT | Performed by: INTERNAL MEDICINE

## 2024-08-12 PROCEDURE — 84443 ASSAY THYROID STIM HORMONE: CPT | Performed by: INTERNAL MEDICINE

## 2024-08-12 RX ORDER — METOPROLOL SUCCINATE 25 MG/1
25 TABLET, EXTENDED RELEASE ORAL DAILY
Qty: 30 TABLET | Refills: 11 | Status: SHIPPED | OUTPATIENT
Start: 2024-08-12 | End: 2025-08-12

## 2024-08-12 NOTE — PROGRESS NOTES
Subjective:   Patient ID:  Patience Craft is a 69 y.o. female who presents for evaluation of Chest Pain (Pt states she went to follow up with her PCP. Pt had an stress test ordered and on the day of her appointment when she made it to her appointment her heart rate & Bp was too elevated and they recommenced she didn't complete the testing. )      HPI  2024.    69-year-old female, with history below.  Comes in for evaluation of chest pain.    She was supposed to go for a stress test last week however her vitals showed tachycardia with heart rates in the 120s so the stress test was canceled.    Complains of easy fatigue with minimal exertion.  She states that the most clinical activity she feels like she ran the [place].  And starts to sweat a lot.  Along with that she gets chest tightness she calls it mild 4 to 5/10, tightness to the mid chest that goes away with rest.    Reviewed her CT scan no significant coronary atherosclerosis on CT of the abdomen.    She also gets dyspnea on exertion.  She reports subjective swelling but however I do not see swelling on exam.    She also has palpitations for the last few weeks.      Past Medical History:   Diagnosis Date    Hypertension        Past Surgical History:   Procedure Laterality Date     SECTION         Social History     Tobacco Use    Smoking status: Never    Smokeless tobacco: Never   Substance Use Topics    Alcohol use: Not Currently     Alcohol/week: 2.0 standard drinks of alcohol     Types: 2 Shots of liquor per week    Drug use: Never       Family History   Problem Relation Name Age of Onset    Hypertension Mother          with CVA    Cancer Mother          breast    Heart disease Father          heavy smoker       Review of Systems   Cardiovascular:  Positive for chest pain, dyspnea on exertion and palpitations. Negative for syncope.   Genitourinary: Negative.    Neurological: Negative.        Current Outpatient Medications on File Prior to  Visit   Medication Sig    atorvastatin (LIPITOR) 40 MG tablet TAKE 1 TABLET BY MOUTH ONCE DAILY IN THE EVENING    chlorthalidone (HYGROTEN) 25 MG Tab Take 1 tablet (25 mg total) by mouth once daily.    cyclobenzaprine (FLEXERIL) 10 MG tablet Take 1 tablet (10 mg total) by mouth every evening.    ezetimibe (ZETIA) 10 mg tablet Take 1 tablet (10 mg total) by mouth once daily.    ibuprofen (ADVIL,MOTRIN) 800 MG tablet Take 800 mg by mouth every 6 (six) hours as needed for Pain.    mecobalamin (B12 ACTIVE ORAL) Take 1 tablet by mouth Daily.    nitroGLYCERIN (NITROSTAT) 0.4 MG SL tablet Place 1 tablet (0.4 mg total) under the tongue every 5 (five) minutes as needed for Chest pain.    coenzyme Q10 200 mg capsule Take 200 mg by mouth once daily. (Patient not taking: Reported on 8/2/2024)     No current facility-administered medications on file prior to visit.       Objective:   Objective:  Wt Readings from Last 3 Encounters:   08/12/24 82.5 kg (181 lb 14.1 oz)   08/02/24 82.1 kg (181 lb)   05/24/24 82.6 kg (182 lb 1.6 oz)     Temp Readings from Last 3 Encounters:   08/02/24 96.8 °F (36 °C) (Temporal)   05/24/24 98 °F (36.7 °C) (Oral)   08/07/23 97.6 °F (36.4 °C) (Oral)     BP Readings from Last 3 Encounters:   08/12/24 (!) 147/86   08/07/24 (!) 140/75   08/02/24 122/76     Pulse Readings from Last 3 Encounters:   08/12/24 86   08/07/24 106   08/02/24 87       Physical Exam  Vitals reviewed.   Constitutional:       Appearance: She is well-developed.   Neck:      Vascular: No carotid bruit.   Cardiovascular:      Rate and Rhythm: Normal rate and regular rhythm.      Pulses: Intact distal pulses.      Heart sounds: Normal heart sounds. No murmur heard.  Pulmonary:      Breath sounds: Normal breath sounds.   Neurological:      Mental Status: She is oriented to person, place, and time.         Lab Results   Component Value Date    CHOL 178 05/24/2024    CHOL 155 10/26/2022     Lab Results   Component Value Date    HDL 46  "05/24/2024    HDL 45 10/26/2022     Lab Results   Component Value Date    LDLCALC 111.0 05/24/2024    LDLCALC 94.8 10/26/2022     Lab Results   Component Value Date    TRIG 105 05/24/2024    TRIG 76 10/26/2022     Lab Results   Component Value Date    CHOLHDL 25.8 05/24/2024    CHOLHDL 29.0 10/26/2022       Chemistry        Component Value Date/Time     05/24/2024 1005    K 4.0 05/24/2024 1005    CL 99 05/24/2024 1005    CO2 30 (H) 05/24/2024 1005    BUN 15 05/24/2024 1005    CREATININE 0.8 05/24/2024 1005    GLU 88 05/24/2024 1005        Component Value Date/Time    CALCIUM 10.9 (H) 05/24/2024 1005    ALKPHOS 82 05/24/2024 1005    AST 19 05/24/2024 1005    ALT 25 05/24/2024 1005    BILITOT 0.7 05/24/2024 1005          Lab Results   Component Value Date    TSH 5.111 (H) 05/24/2024     No results found for: "INR", "PROTIME"  Lab Results   Component Value Date    WBC 9.00 05/24/2024    HGB 14.1 05/24/2024    HCT 43.2 05/24/2024    MCV 96 05/24/2024     05/24/2024     BNP  @LABRCNTIP(BNP,BNPTRIAGEBLO)@  CrCl cannot be calculated (Patient's most recent lab result is older than the maximum 7 days allowed.).     Imaging:  ======    No results found for this or any previous visit.    No results found for this or any previous visit.    No results found for this or any previous visit.    No results found for this or any previous visit.    No valid procedures specified.    No results found for this or any previous visit.      No results found for this or any previous visit.      No results found for this or any previous visit.      Diagnostic Results:  ECG: Reviewed  Normal sinus rhythm   Normal ECG   No previous ECGs available   Confirmed by Elyse Wade MD ([id number]) on 8/4/2024 3:47:27 PM     Referred By:             Confirmed By:Elyse     The 10-year ASCVD risk score (Sim WALKER, et al., 2019) is: 18%    Values used to calculate the score:      Age: 69 years      Sex: Female      Is Non-  " American: No      Diabetic: No      Tobacco smoker: No      Systolic Blood Pressure: 147 mmHg      Is BP treated: Yes      HDL Cholesterol: 46 mg/dL      Total Cholesterol: 178 mg/dL        Assessment and Plan:   MEJÍA (dyspnea on exertion)  -     Echo; Future    Chest pain on exertion  -     Ambulatory referral/consult to Cardiology  -     Nuclear Stress - Cardiology Interpreted; Future    Palpitations  -     Cardiac Monitor - 3-15 Day Adult (Cupid Only); Future    Excessive sweating  -     Cardiac Monitor - 3-15 Day Adult (Cupid Only); Future  -     TSH; Future; Expected date: 08/12/2024    Suspected sleep apnea  -     Ambulatory referral/consult to Sleep Disorders; Future; Expected date: 08/19/2024    Primary hypertension  -     metoprolol succinate (TOPROL-XL) 25 MG 24 hr tablet; Take 1 tablet (25 mg total) by mouth once daily.  Dispense: 30 tablet; Refill: 11    BMI 33.0-33.9,adult        Reviewed all tests and above medical conditions with patient in detail and formulated treatment plan.  Risk factor modification discussed.   Cardiac low salt diet discussed.  Maintaining healthy weight and weight loss goals were discussed in clinic.  Blood pressure not at goal also add metoprolol.  Follow up in 3 months

## 2024-08-26 ENCOUNTER — OFFICE VISIT (OUTPATIENT)
Dept: PRIMARY CARE CLINIC | Facility: CLINIC | Age: 69
End: 2024-08-26

## 2024-08-26 VITALS
HEART RATE: 86 BPM | OXYGEN SATURATION: 98 % | HEIGHT: 62 IN | TEMPERATURE: 97 F | WEIGHT: 178.56 LBS | DIASTOLIC BLOOD PRESSURE: 70 MMHG | BODY MASS INDEX: 32.86 KG/M2 | SYSTOLIC BLOOD PRESSURE: 126 MMHG

## 2024-08-26 DIAGNOSIS — G89.4 CHRONIC PAIN SYNDROME: Primary | Chronic | ICD-10-CM

## 2024-08-26 DIAGNOSIS — M19.90 INFLAMMATORY ARTHRITIS: ICD-10-CM

## 2024-08-26 DIAGNOSIS — H61.21 RIGHT EAR IMPACTED CERUMEN: ICD-10-CM

## 2024-08-26 DIAGNOSIS — I10 PRIMARY HYPERTENSION: Chronic | ICD-10-CM

## 2024-08-26 DIAGNOSIS — E83.52 HYPERCALCEMIA: ICD-10-CM

## 2024-08-26 DIAGNOSIS — E78.5 DYSLIPIDEMIA: ICD-10-CM

## 2024-08-26 PROBLEM — H61.22 LEFT EAR IMPACTED CERUMEN: Status: RESOLVED | Noted: 2024-05-24 | Resolved: 2024-08-26

## 2024-08-26 PROCEDURE — 69209 REMOVE IMPACTED EAR WAX UNI: CPT | Mod: S$PBB,RT,, | Performed by: INTERNAL MEDICINE

## 2024-08-26 PROCEDURE — 99215 OFFICE O/P EST HI 40 MIN: CPT | Mod: PBBFAC,PN | Performed by: INTERNAL MEDICINE

## 2024-08-26 PROCEDURE — 99999 PR PBB SHADOW E&M-EST. PATIENT-LVL V: CPT | Mod: PBBFAC,,, | Performed by: INTERNAL MEDICINE

## 2024-08-26 PROCEDURE — 99215 OFFICE O/P EST HI 40 MIN: CPT | Mod: 25,S$PBB,, | Performed by: INTERNAL MEDICINE

## 2024-08-26 PROCEDURE — 69209 REMOVE IMPACTED EAR WAX UNI: CPT | Mod: PBBFAC,PN | Performed by: INTERNAL MEDICINE

## 2024-08-26 NOTE — ASSESSMENT & PLAN NOTE
BP, heart rate improved w current Rx -(may need alternate Rx if cont hypercalcemia w d/c of chlorthalidone)

## 2024-08-26 NOTE — PATIENT INSTRUCTIONS
If you are feeling unwell, we'd like to be the first ones to know here at [facility]! Please give us a call. Same day appointments are our top priority to keep you well and out of the emergency rooms and hospitals. Call [phone] for our direct line. After hours advice is always available. Please call [phone] after hours to speak to the on-call team.      Please provide immunization records so can see what's still needed   (if anything)

## 2024-08-26 NOTE — PROGRESS NOTES
Patience Craft  08/26/2024  [id number]    Natalie Guzmán MD  Patient Care Team:  Natalie Guzmán MD as PCP - General (Internal Medicine)  Lucero Hurtado NP as Nurse Practitioner (Family Medicine)    Visit Type: Follow-up    Ms. Craft is a 69 year old female here for scheduled f/u.    Chronic medical issues include obesity, B hip OA, chronic back pain w neuropathy, HTN, HLD    History of Present Illness    CHIEF COMPLAINT:  Ms. Craft presents today for chronic pain and inflammation affecting mobility.    CHRONIC PAIN AND INFLAMMATION:  She reports chronic, dull pain affecting daily activities, particularly walking. Pain in feet, shoulders, knees, and toes is noted, with ankle and feet swelling specifically mentioned. Pain is exacerbated by extreme temperatures. She rates pain typically at 5/10, interfering with desired activities. Stiffness occurs after elevating legs to reduce swelling. She expresses concern about chronic inflammation affecting her quality of life and ability to engage with grandchildren. Prior prednisone 5mg for 3 days improved movement and reduced pain, allowing her to walk a mile.     CARDIOVASCULAR HEALTH:  Recent cardiology appointment revealed a 'pretty' EKG. New medication, Topolol (beta-blocker), was prescribed. Blood pressure is reported as fine with decreased heart rate. She takes blood pressure regularly.    LABORATORY RESULTS:  Recent labs show mildly elevated TSH with normal free T4, excellent hemoglobin A1c, high calcium (without supplementation), and elevated LDL despite two medications. She is due for a lipid panel recheck and is willing to have lab work done at the [place].    MEDICATIONS:  Current medications include Lipitor (needs refill) and Chlorthalidone. She has used prednisone 5mg for short-term pain relief.    FAMILY HISTORY:  Mother had rheumatoid arthritis with minimal deformities except for small knobs on toes, similar to what she now experiences. Both she and  her mother had rheumatic fever over 60 years ago.    PAST MEDICAL HISTORY:  History of rheumatic fever at age 8-9 (62-63 years ago). Mild osteoporosis diagnosed in 1971.    SOCIAL HISTORY:  Recently retired. Socially active, particularly with family. Picks up grandchild on weekends for one-on-one activities, including shopping with 11-year-old granddaughter.    PHYSICAL ACTIVITY:  Difficulty walking long distances due to pain and swelling. Uses shopping cart for support in stores. Walks with neighbors in air-conditioned hallway (four laps equals one mile). Swims in a pool three mornings per week.    PREVENTIVE CARE:  Due for tetanus, shingles, RSV, pneumococcal, and COVID vaccines. Previous shingles vaccine (two doses) received. Uncertain about tetanus and pneumococcal vaccine status. Mammogram pending insurance approval (Part B coverage expected by October). Last vitamin D level reported as 'great'.    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 Currently only has Medicare Part  but Part B will be active starting sometime in July. She prefers, therefore to defer testing that might have co-pay/deductible until later this Summer or Fall.     C/o R arm and R leg neuropathy and R leg stiffness  C/o R leg swelling  C/o B inguinal pain when lifting legs against pressure  L hip pain affects gait - also w L lateral lower leg pain  Goes to [facility] once weekly and has HEP that helps     GI issues have resolved [place] - she thinks was mostly due to stress of work compounded by less healthy meal options, disrupted sleep accompanying that work      Still c/o excessive sweating - worse w exertion such as going up stairs - also w night sweats   C/o intermittent burning sensation of tongue and sometimes lips   Has not noted specific trigger(s) for this    Upcoming  appointments:  Future Appointments       Date Provider Specialty Appt Notes    8/27/2024  Lab Chronic pain syndrome [G89.4]; Inflammatory arthritis [M19.90]    9/4/2024  Radiology .    9/4/2024  Cardiology .    9/4/2024  Cardiology .    9/4/2024  Radiology NPR    9/4/2024  Cardiology .    9/26/2024 Rayna Sharma NP Primary Care AWV    11/18/2024 Natalie Guzmán MD Primary Care Three month f/u    11/21/2024 Ralph Grace MD Cardiology 3 months    1/31/2025 Asif Pozo MD Rheumatology Inflammatory arthritis [M19.90]             The following were reviewed: Active problem list, medication list, allergies, family history, social history, and Health Maintenance.     Medications have been reviewed and reconciled with patient at visit today.    Review of Systems   Exam:  Vitals:    08/26/24 0932   BP: 126/70   Pulse: 86   Temp: 96.6 °F (35.9 °C)     Weight: 81 kg (178 lb 9.2 oz)   Body mass index is 32.66 kg/m².    BP Readings from Last 3 Encounters:   08/26/24 126/70   08/12/24 (!) 147/86   08/07/24 (!) 140/75      Wt Readings from Last 3 Encounters:   08/26/24 0932 81 kg (178 lb 9.2 oz)   08/12/24 1253 82.5 kg (181 lb 14.1 oz)   08/02/24 1108 82.1 kg (181 lb)      Physical Exam  Vitals reviewed.   Constitutional:       General: She is not in acute distress.     Appearance: Normal appearance. She is obese.   HENT:      Head: Normocephalic and atraumatic.      Right Ear: External ear normal. There is impacted cerumen.      Left Ear: Tympanic membrane, ear canal and external ear normal.      Nose: Nose normal.      Mouth/Throat:      Mouth: Mucous membranes are moist.      Pharynx: Oropharynx is clear.   Eyes:      Extraocular Movements: Extraocular movements intact.      Conjunctiva/sclera: Conjunctivae normal.      Comments: glasses   Neck:      Vascular: No carotid bruit.   Cardiovascular:      Rate and Rhythm: Normal rate and regular rhythm.      Heart sounds: No murmur heard.  Pulmonary:      Effort:  "Pulmonary effort is normal. No respiratory distress.      Breath sounds: No wheezing, rhonchi or rales.   Abdominal:      General: Bowel sounds are normal.      Palpations: Abdomen is soft.      Tenderness: There is no abdominal tenderness. There is no guarding.   Musculoskeletal:      Right lower leg: No edema.      Left lower leg: No edema.   Lymphadenopathy:      Cervical: No cervical adenopathy.   Skin:     General: Skin is warm and dry.   Neurological:      Mental Status: She is alert and oriented to person, place, and time. Mental status is at baseline.      Gait: Gait abnormal.   Psychiatric:         Mood and Affect: Mood normal.         Behavior: Behavior normal.         Thought Content: Thought content normal.         Judgment: Judgment normal.               Laboratory Reviewed  Lab Results   Component Value Date    WBC 9.00 05/24/2024    HGB 14.1 05/24/2024    HCT 43.2 05/24/2024     05/24/2024    MCV 96 05/24/2024    CHOL 178 05/24/2024    TRIG 105 05/24/2024    HDL 46 05/24/2024    LDLCALC 111.0 05/24/2024    ALT 25 05/24/2024    AST 19 05/24/2024     05/24/2024    K 4.0 05/24/2024    CL 99 05/24/2024    CREATININE 0.8 05/24/2024    BUN 15 05/24/2024    CO2 30 (H) 05/24/2024    MG 1.8 05/24/2024    TSH 5.280 (H) 08/12/2024    FREET4 0.77 08/12/2024    HGBA1C 5.2 05/24/2024     Lab Results   Component Value Date    CALCIUM 10.9 (H) 05/24/2024    PHOS 3.6 08/07/2023      Lab Results   Component Value Date    ZPZLWIZV59 1343 (H) 05/24/2024   No results found for: "FOLATE" No results found for: "UIBC", "IRON", "TRANS", "TRANSFERRIN", "TIBC", "LABIRON", "FESATURATED"   Lab Results   Component Value Date    EGFRNORACEVR >60.0 05/24/2024    ALBUMIN 4.1 05/24/2024     Lab Results   Component Value Date    UWRKSQOA05LD 50 05/24/2024          Assessment:   69 y.o. female with multiple co-morbid illnesses here for continued follow up of medical problems.      The primary encounter diagnosis was Chronic " pain syndrome. Diagnoses of Inflammatory arthritis, Hypercalcemia, Dyslipidemia, Right ear impacted cerumen, and Primary hypertension were also pertinent to this visit.      Plan:   1. Chronic pain syndrome  -     Sedimentation rate; Future; Expected date: 08/26/2024  -     C-REACTIVE PROTEIN; Future; Expected date: 08/26/2024  -     BONG; Future; Expected date: 08/26/2024  -     RHEUMATOID FACTOR; Future; Expected date: 08/26/2024  -     URIC ACID; Future; Expected date: 08/26/2024    2. Inflammatory arthritis  -     Sedimentation rate; Future; Expected date: 08/26/2024  -     C-REACTIVE PROTEIN; Future; Expected date: 08/26/2024  -     BONG; Future; Expected date: 08/26/2024  -     RHEUMATOID FACTOR; Future; Expected date: 08/26/2024  -     URIC ACID; Future; Expected date: 08/26/2024  -     Ambulatory referral/consult to Rheumatology; Future; Expected date: 09/02/2024    3. Hypercalcemia  Assessment & Plan:  If calcium still elevated recommend to hold/discontinue chlorthalidone then recheck level     Orders:  -     PTH, Intact; Future; Expected date: 08/26/2024  -     RENAL FUNCTION PANEL; Future; Expected date: 08/26/2024    4. Dyslipidemia  -     Lipid Panel; Future; Expected date: 08/27/2024    5. Right ear impacted cerumen  Assessment & Plan:  Ear wash today      6. Primary hypertension  Assessment & Plan:  BP, heart rate improved w current Rx -(may need alternate Rx if cont hypercalcemia w d/c of chlorthalidone)            Health Maintenance         Date Due Completion Date    TETANUS VACCINE Never done ---    Mammogram Never done ---    DEXA Scan Never done ---    Colorectal Cancer Screening Never done ---    Shingles Vaccine (1 of 2) Never done ---    RSV Vaccine (Age 60+ and Pregnant patients) (1 - 1-dose 60+ series) Never done ---    Pneumococcal Vaccines (Age 65+) (1 of 1 - PCV) Never done ---    COVID-19 Vaccine (2 - 2023-24 season) 09/01/2023 11/30/2021    Influenza Vaccine (1) 09/01/2024 9/21/2022  (Done)    [place] 9[place]21/20[place]    Lipid Panel 05/24/2029 5/24/2024            -Patient's lab results were reviewed and discussed with patient  -Treatment options and alternatives were discussed with the patient. Patient expressed understanding. Patient was given the opportunity to ask questions and be an active participant in their medical care. Patient had no further questions or concerns at this time.   -Patient is an overall moderate risk for health complications from their medical conditions.     Follow up: Follow up in about 3 months (around 11/26/2024) for Follow Up w me (and EAWV sometime next month) .    After visit summary printed and given to patient upon discharge.  Patient care plan included in After visit summary.    TOTAL TIME evaluating and managing this patient for this encounter was 46 minutes. This time was spent personally by me on some of the following activities: review of patient's past medical history, assessing age-appropriate health maintenance needs, review of any interval history, review and interpretation of lab results, review and interpretation of imaging test results, review and interpretation of cardiology test results, reviewing consulting specialist notes, obtaining history from the patient and family, examination of the patient, medication reconciliation, managing and/or ordering prescription medications, ordering imaging tests, ordering referral to subspecialty provider(s), educating patient and answering their questions about diagnosis, treatment plan, and goals of treatment, discussing planned follow-up and final documentation of the visit. This time was exclusive of any separately billable procedures for this patient and exclusive of time spent treating any other patients.     This note was generated with the assistance of ambient listening technology. Verbal consent was obtained by the patient and accompanying visitor(s) for the recording of patient  appointment to facilitate this note. I attest to having reviewed and edited the generated note for accuracy, though some syntax or spelling errors may persist. Please contact the author of this note for any clarification.

## 2024-08-27 ENCOUNTER — LAB VISIT (OUTPATIENT)
Dept: LAB | Facility: HOSPITAL | Age: 69
End: 2024-08-27
Attending: INTERNAL MEDICINE

## 2024-08-27 DIAGNOSIS — E83.52 HYPERCALCEMIA: ICD-10-CM

## 2024-08-27 DIAGNOSIS — E78.5 DYSLIPIDEMIA: ICD-10-CM

## 2024-08-27 DIAGNOSIS — M19.90 INFLAMMATORY ARTHRITIS: ICD-10-CM

## 2024-08-27 DIAGNOSIS — G89.4 CHRONIC PAIN SYNDROME: Chronic | ICD-10-CM

## 2024-08-27 LAB
ALBUMIN SERPL BCP-MCNC: 4.1 G/DL (ref 3.5–5.2)
ANION GAP SERPL CALC-SCNC: 13 MMOL/L (ref 8–16)
BUN SERPL-MCNC: 10 MG/DL (ref 8–23)
CALCIUM SERPL-MCNC: 10.1 MG/DL (ref 8.7–10.5)
CHLORIDE SERPL-SCNC: 100 MMOL/L (ref 95–110)
CHOLEST SERPL-MCNC: 141 MG/DL (ref 120–199)
CHOLEST/HDLC SERPL: 3 {RATIO} (ref 2–5)
CO2 SERPL-SCNC: 28 MMOL/L (ref 23–29)
CREAT SERPL-MCNC: 0.8 MG/DL (ref 0.5–1.4)
CRP SERPL-MCNC: 10.4 MG/L (ref 0–8.2)
ERYTHROCYTE [SEDIMENTATION RATE] IN BLOOD BY PHOTOMETRIC METHOD: 7 MM/HR (ref 0–36)
EST. GFR  (NO RACE VARIABLE): >60 ML/MIN/1.73 M^2
GLUCOSE SERPL-MCNC: 106 MG/DL (ref 70–110)
HDLC SERPL-MCNC: 47 MG/DL (ref 40–75)
HDLC SERPL: 33.3 % (ref 20–50)
LDLC SERPL CALC-MCNC: 77.8 MG/DL (ref 63–159)
NONHDLC SERPL-MCNC: 94 MG/DL
PHOSPHATE SERPL-MCNC: 3.3 MG/DL (ref 2.7–4.5)
POTASSIUM SERPL-SCNC: 3.9 MMOL/L (ref 3.5–5.1)
PTH-INTACT SERPL-MCNC: 48 PG/ML (ref 9–77)
RHEUMATOID FACT SERPL-ACNC: <13 IU/ML (ref 0–15)
SODIUM SERPL-SCNC: 141 MMOL/L (ref 136–145)
TRIGL SERPL-MCNC: 81 MG/DL (ref 30–150)
URATE SERPL-MCNC: 5.2 MG/DL (ref 2.4–5.7)

## 2024-08-27 PROCEDURE — 80061 LIPID PANEL: CPT | Performed by: INTERNAL MEDICINE

## 2024-08-27 PROCEDURE — 80069 RENAL FUNCTION PANEL: CPT | Performed by: INTERNAL MEDICINE

## 2024-08-27 PROCEDURE — 83970 ASSAY OF PARATHORMONE: CPT | Performed by: INTERNAL MEDICINE

## 2024-08-27 PROCEDURE — 86038 ANTINUCLEAR ANTIBODIES: CPT | Performed by: INTERNAL MEDICINE

## 2024-08-27 PROCEDURE — 86431 RHEUMATOID FACTOR QUANT: CPT | Performed by: INTERNAL MEDICINE

## 2024-08-27 PROCEDURE — 86235 NUCLEAR ANTIGEN ANTIBODY: CPT | Mod: 59 | Performed by: INTERNAL MEDICINE

## 2024-08-27 PROCEDURE — 84550 ASSAY OF BLOOD/URIC ACID: CPT | Performed by: INTERNAL MEDICINE

## 2024-08-27 PROCEDURE — 36415 COLL VENOUS BLD VENIPUNCTURE: CPT | Performed by: INTERNAL MEDICINE

## 2024-08-27 PROCEDURE — 85652 RBC SED RATE AUTOMATED: CPT | Performed by: INTERNAL MEDICINE

## 2024-08-27 PROCEDURE — 86140 C-REACTIVE PROTEIN: CPT | Performed by: INTERNAL MEDICINE

## 2024-08-27 PROCEDURE — 86039 ANTINUCLEAR ANTIBODIES (ANA): CPT | Performed by: INTERNAL MEDICINE

## 2024-08-27 NOTE — PROGRESS NOTES
Pt does not use [facility] pt portal - please call w message below:    CRP is mildly elevated - non-specific inflammatory marker.  ESR, uric acid and rheumatoid factor are normal.  BONG not yet resulted.     Lipid panel looks great!

## 2024-08-28 ENCOUNTER — TELEPHONE (OUTPATIENT)
Dept: PRIMARY CARE CLINIC | Facility: CLINIC | Age: 69
End: 2024-08-28

## 2024-08-28 LAB
ANA PATTERN 1: NORMAL
ANA SER QL IF: POSITIVE
ANA TITR SER IF: NORMAL {TITER}

## 2024-08-28 NOTE — TELEPHONE ENCOUNTER
Called pt to give lab results and instructions, I got no answer but left a voicemail.    SJ        ----- Message from Natalie Guzmán MD sent at 8/27/2024  5:49 PM CDT -----  Pt does not use [facility] pt portal - please call w message below:    CRP is mildly elevated - non-specific inflammatory marker.  ESR, uric acid and rheumatoid factor are normal.  BONG not yet resulted.     Lipid panel looks great!

## 2024-08-29 ENCOUNTER — TELEPHONE (OUTPATIENT)
Dept: PRIMARY CARE CLINIC | Facility: CLINIC | Age: 69
End: 2024-08-29

## 2024-08-29 NOTE — TELEPHONE ENCOUNTER
"Spoke with pt and gave lab results and instructions.    SJ        ----- Message from Natalie Guzmán MD sent at 8/29/2024  9:34 AM CDT -----  Pt has [facility] - if message below not viewed please call w information below:     Please let Ms. Craft know that the BONG screen was "weakly" positive - because the screen was positive further BONG tests are being run.     Please check to see if she needs help downloading or setting up [facility]   "

## 2024-08-29 NOTE — PROGRESS NOTES
"Pt has [employer] - if message below not viewed please call w information below:     Please let Ms. Craft know that the BONG screen was "weakly" positive - because the screen was positive further BONG tests are being run.     Please check to see if she needs help downloading or setting up [employer] [facility] "

## 2024-08-30 LAB
ANTI SM ANTIBODY: 0.1 RATIO (ref 0–0.99)
ANTI SM/RNP ANTIBODY: 0.06 RATIO (ref 0–0.99)
ANTI-SM INTERPRETATION: NEGATIVE
ANTI-SM/RNP INTERPRETATION: NEGATIVE
ANTI-SSA ANTIBODY: 0.06 RATIO (ref 0–0.99)
ANTI-SSA INTERPRETATION: NEGATIVE
ANTI-SSB ANTIBODY: 0.07 RATIO (ref 0–0.99)
ANTI-SSB INTERPRETATION: NEGATIVE
DSDNA AB SER-ACNC: NORMAL [IU]/ML

## 2024-08-31 NOTE — PROGRESS NOTES
Pt does not use [facility] pt portal - please call w message below:    BONG profile is negative, normal.

## 2024-09-03 ENCOUNTER — TELEPHONE (OUTPATIENT)
Dept: PRIMARY CARE CLINIC | Facility: CLINIC | Age: 69
End: 2024-09-03

## 2024-09-03 NOTE — TELEPHONE ENCOUNTER
Spoke with pt and gave lab results and instructions.    SJ      ----- Message from Natalie Guzmán MD sent at 8/31/2024  3:20 PM CDT -----  Pt does not use [facility] pt portal - please call w message below:    BONG profile is negative, normal.

## 2024-09-04 ENCOUNTER — HOSPITAL ENCOUNTER (OUTPATIENT)
Dept: CARDIOLOGY | Facility: HOSPITAL | Age: 69
Discharge: HOME | End: 2024-09-04
Attending: INTERNAL MEDICINE
Payer: MEDICARE

## 2024-10-08 RX ORDER — ATORVASTATIN CALCIUM 40 MG/1
TABLET, FILM COATED ORAL
Qty: 30 TABLET | Refills: 5 | Status: SHIPPED | OUTPATIENT
Start: 2024-10-08 | End: 2025-04-06

## 2024-10-17 RX ORDER — CHLORTHALIDONE 25 MG/1
25 TABLET ORAL
Qty: 30 TABLET | Refills: 0 | Status: SHIPPED | OUTPATIENT
Start: 2024-10-17

## 2024-11-15 RX ORDER — CHLORTHALIDONE 25 MG/1
25 TABLET ORAL
Qty: 30 TABLET | Refills: 0 | Status: SHIPPED | OUTPATIENT
Start: 2024-11-15

## 2024-11-18 ENCOUNTER — TELEPHONE (OUTPATIENT)
Dept: PRIMARY CARE CLINIC | Facility: CLINIC | Age: 69
End: 2024-11-18

## 2024-11-18 RX ORDER — CELECOXIB 100 MG/1
100 CAPSULE ORAL DAILY
Qty: 30 CAPSULE | Refills: 2 | Status: SHIPPED | OUTPATIENT
Start: 2024-11-18 | End: 2025-02-16

## 2024-11-18 NOTE — PROGRESS NOTES
"  Call to pt to advise as per PCP's msg, "Please advise not to take any other OTC NSAIDs same day if taking Celebrex". No ans, no option to leave a msg. No MyChart access.   "

## 2024-11-18 NOTE — TELEPHONE ENCOUNTER
Pt only has Medicare Part A - part B was dropped- pt working on getting Part B again- appt cancelled for today     Pt would like rx for Celebrex for arthritis pain - [facility]

## 2024-12-05 ENCOUNTER — TELEPHONE (OUTPATIENT)
Dept: PRIMARY CARE CLINIC | Facility: CLINIC | Age: 69
End: 2024-12-05

## 2024-12-05 NOTE — TELEPHONE ENCOUNTER
Returned call to pt, addressed concern re: [facility] - 2025 accepted plans.     Message from Celestino sent at 12/5/2024 10:32 AM CST Contact: [phone] Patient is returning a phone call.  Who left a message for the patient: Tiffany Holguin, Rn Does patient know what this is regarding:    Would you like a call back, or a response through your [facility] portal?:   call back

## 2024-12-16 RX ORDER — CHLORTHALIDONE 25 MG/1
25 TABLET ORAL DAILY
Qty: 30 TABLET | Refills: 0 | Status: SHIPPED | OUTPATIENT
Start: 2024-12-16

## 2025-01-14 RX ORDER — CHLORTHALIDONE 25 MG/1
25 TABLET ORAL DAILY
Qty: 30 TABLET | Refills: 0 | Status: SHIPPED | OUTPATIENT
Start: 2025-01-14

## 2025-01-31 ENCOUNTER — OFFICE VISIT (OUTPATIENT)
Dept: RHEUMATOLOGY | Facility: CLINIC | Age: 70
End: 2025-01-31

## 2025-01-31 ENCOUNTER — HOSPITAL ENCOUNTER (OUTPATIENT)
Dept: RADIOLOGY | Facility: HOSPITAL | Age: 70
Discharge: HOME | End: 2025-01-31
Attending: INTERNAL MEDICINE

## 2025-01-31 VITALS
HEART RATE: 77 BPM | SYSTOLIC BLOOD PRESSURE: 132 MMHG | DIASTOLIC BLOOD PRESSURE: 65 MMHG | HEIGHT: 62 IN | WEIGHT: 179.69 LBS | BODY MASS INDEX: 33.07 KG/M2

## 2025-01-31 DIAGNOSIS — M16.10 OSTEOARTHRITIS OF PELVIC REGION: ICD-10-CM

## 2025-01-31 DIAGNOSIS — M19.90 INFLAMMATORY ARTHRITIS: ICD-10-CM

## 2025-01-31 DIAGNOSIS — E03.9 HYPOTHYROIDISM, UNSPECIFIED TYPE: ICD-10-CM

## 2025-01-31 DIAGNOSIS — M54.50 CHRONIC LOW BACK PAIN, UNSPECIFIED BACK PAIN LATERALITY, UNSPECIFIED WHETHER SCIATICA PRESENT: ICD-10-CM

## 2025-01-31 DIAGNOSIS — M46.1 SACROILIITIS: ICD-10-CM

## 2025-01-31 DIAGNOSIS — Z51.81 MEDICATION MONITORING ENCOUNTER: ICD-10-CM

## 2025-01-31 DIAGNOSIS — E55.9 VITAMIN D INSUFFICIENCY: ICD-10-CM

## 2025-01-31 DIAGNOSIS — G89.29 CHRONIC LOW BACK PAIN, UNSPECIFIED BACK PAIN LATERALITY, UNSPECIFIED WHETHER SCIATICA PRESENT: ICD-10-CM

## 2025-01-31 DIAGNOSIS — M19.90 INFLAMMATORY ARTHRITIS: Primary | ICD-10-CM

## 2025-01-31 DIAGNOSIS — R76.8 POSITIVE ANA (ANTINUCLEAR ANTIBODY): ICD-10-CM

## 2025-01-31 PROCEDURE — 99999 PR PBB SHADOW E&M-EST. PATIENT-LVL V: CPT | Mod: PBBFAC,,, | Performed by: INTERNAL MEDICINE

## 2025-01-31 PROCEDURE — G2211 COMPLEX E/M VISIT ADD ON: HCPCS | Mod: S$PBB,,, | Performed by: INTERNAL MEDICINE

## 2025-01-31 PROCEDURE — 73630 X-RAY EXAM OF FOOT: CPT | Mod: 26,50,, | Performed by: STUDENT IN AN ORGANIZED HEALTH CARE EDUCATION/TRAINING PROGRAM

## 2025-01-31 PROCEDURE — 99215 OFFICE O/P EST HI 40 MIN: CPT | Mod: PBBFAC | Performed by: INTERNAL MEDICINE

## 2025-01-31 PROCEDURE — 73630 X-RAY EXAM OF FOOT: CPT | Mod: TC,50

## 2025-01-31 PROCEDURE — 73130 X-RAY EXAM OF HAND: CPT | Mod: TC,50

## 2025-01-31 PROCEDURE — 99205 OFFICE O/P NEW HI 60 MIN: CPT | Mod: S$PBB,,, | Performed by: INTERNAL MEDICINE

## 2025-01-31 PROCEDURE — 73130 X-RAY EXAM OF HAND: CPT | Mod: 26,50,, | Performed by: STUDENT IN AN ORGANIZED HEALTH CARE EDUCATION/TRAINING PROGRAM

## 2025-01-31 RX ORDER — GABAPENTIN 100 MG/1
100 CAPSULE ORAL NIGHTLY
Qty: 30 CAPSULE | Refills: 3 | Status: SHIPPED | OUTPATIENT
Start: 2025-01-31 | End: 2025-03-02

## 2025-01-31 RX ORDER — METHOCARBAMOL 500 MG/1
500 TABLET, FILM COATED ORAL 3 TIMES DAILY PRN
Qty: 30 TABLET | Refills: 0 | Status: SHIPPED | OUTPATIENT
Start: 2025-01-31 | End: 2025-02-10

## 2025-01-31 NOTE — PROGRESS NOTES
RHEUMATOLOGY OUTPATIENT CLINIC NOTE    2025    Attending Rheumatologist: Asif Pozo  Primary Care Provider/Physician Requesting Consultation: Natalie Guzmán MD   Chief Complaint/Reason For Consultation:  Inflammatory arthritis      Subjective:     Patience Craft is a 69 y.o. Other female with joint pain    Chronic widespread arthralgias.  Describes palindromic pattern of pain.  Mechanical pain features predominantly.  Associated gelling phenomena.    Review of Systems   Constitutional:  Negative for fever.   HENT:          Denies significant sicca sx.  Denies jaw claudication.   Eyes:  Negative for photophobia and pain.   Respiratory:  Negative for cough and shortness of breath.    Cardiovascular:  Negative for chest pain.   Gastrointestinal:  Positive for heartburn. Negative for blood in stool and melena.   Genitourinary:  Negative for dysuria, hematuria and urgency.   Musculoskeletal:  Positive for back pain and joint pain. Negative for falls.   Skin:  Negative for rash.        No hx of PsO.  Denies RP.   Neurological:  Positive for tingling (RUE, chronic). Negative for focal weakness, weakness and headaches.       Chronic comorbid conditions affecting medical decision making today:  Past Medical History:   Diagnosis Date    Hypertension      Past Surgical History:   Procedure Laterality Date     SECTION       Family History   Problem Relation Name Age of Onset    Hypertension Mother          with CVA    Cancer Mother          breast    Heart disease Father          heavy smoker     Social History     Tobacco Use   Smoking Status Never   Smokeless Tobacco Never       Current Outpatient Medications:     atorvastatin (LIPITOR) 40 MG tablet, TAKE 1 TABLET BY MOUTH ONCE DAILY IN THE EVENING, Disp: 30 tablet, Rfl: 5    celecoxib (CELEBREX) 100 MG capsule, Take 1 capsule (100 mg total) by mouth once daily., Disp: 30 capsule, Rfl: 2    chlorthalidone (HYGROTEN) 25 MG Tab, Take 1 tablet (25 mg total)  by mouth once daily., Disp: 30 tablet, Rfl: 0    ezetimibe (ZETIA) 10 mg tablet, Take 1 tablet (10 mg total) by mouth once daily., Disp: 90 tablet, Rfl: 3    ibuprofen (ADVIL,MOTRIN) 800 MG tablet, Take 800 mg by mouth every 6 (six) hours as needed for Pain., Disp: , Rfl:     mecobalamin (B12 ACTIVE ORAL), Take 1 tablet by mouth Daily., Disp: , Rfl:     metoprolol succinate (TOPROL-XL) 25 MG 24 hr tablet, Take 1 tablet (25 mg total) by mouth once daily., Disp: 30 tablet, Rfl: 11    nitroGLYCERIN (NITROSTAT) 0.4 MG SL tablet, Place 1 tablet (0.4 mg total) under the tongue every 5 (five) minutes as needed for Chest pain., Disp: 30 tablet, Rfl: 0     Objective:     Vitals:    01/31/25 1006   BP: 132/65   Pulse: 77     Physical Exam   Eyes: Conjunctivae are normal.   Pulmonary/Chest: Effort normal. No respiratory distress.   Musculoskeletal:         General: Deformity present. No swelling or tenderness. Normal range of motion.   Neurological: She displays no weakness.   Skin: No rash noted.       Reviewed available old and all outside pertinent medical records available.    All lab results personally reviewed and interpreted by me.       ASSESSMENT / PLAN     1. Inflammatory arthritis  Mixed pattern arthralgias.  No synovitis on exam.  Advanced DJD/DDD changes on imaging.  Equivocal findings of CT SIj, need MRI.  Failure to multiple NSAIDs and PT.  Ambulatory referral/consult to Rheumatology    X-Ray Hand Complete Bilateral    X-Ray Foot AP Bilateral      2. Sacroiliitis  HLA B27 Antigen    C-Reactive Protein    Sedimentation rate    MRI Sacroiliac Joint W W/O Contrast      3. Hypothyroidism, unspecified type  Ambulatory referral/consult to Endocrinology      4. Positive BONG (antinuclear antibody)  Impression of false positive.  Negative GREGG.  No related end organ damage.  C3 Complement    C4 Complement    Anti-DNA Ab, Double-Stranded    Protein/Creatinine Ratio, Urine    CBC Auto Differential    Comprehensive Metabolic  Panel      5. Chronic low back pain, unspecified back pain laterality, unspecified whether sciatica present  C-Reactive Protein    Sedimentation rate    Ambulatory Referral/Consult to Physical/Occupational Therapy    methocarbamoL (ROBAXIN) 500 MG Tab    gabapentin (NEURONTIN) 100 MG capsule      6. Osteoarthritis of pelvic region  Ambulatory Referral/Consult to Physical/Occupational Therapy      7. Medication monitoring encounter  Hepatitis B Surface Antigen    Hepatitis B Core Antibody, Total    Quantiferon Gold TB      8. Vitamin D insufficiency  Vitamin D level              Visit today included increased complexity associated with the care of the episodic problem Inflammatory arthritis addressed and managing the longitudinal care of the patient due to the serious and/or complex managed problem(s) Chronic low back pain, Positive BONG, Medication monitoring encounter .    60 minutes of total time spent on the encounter, which includes face to face time and non-face to face time preparing to see the patient (eg, review of tests), Obtaining and/or reviewing separately obtained history, Documenting clinical information in the electronic or other health record, Independently interpreting results (not separately reported) and communicating results to the patient/family/caregiver, or Care coordination (not separately reported).     Asif Pozo M.D.

## 2025-02-03 ENCOUNTER — TELEPHONE (OUTPATIENT)
Dept: PRIMARY CARE CLINIC | Facility: CLINIC | Age: 70
End: 2025-02-03

## 2025-02-03 NOTE — TELEPHONE ENCOUNTER
----- Message from Natalie Guzmán MD sent at 1/31/2025  6:28 PM CST -----  Regarding: needs appt  Please contact pt to schedule EAWV and appt w me - last [facility]= appt w me in August  ----- Message -----  From: Asif Pozo MD  Sent: 1/31/2025  11:44 AM CST  To: Natalie Guzmán MD

## 2025-02-03 NOTE — TELEPHONE ENCOUNTER
Attempted to call pt to schedule EAWV and appt with Dr. Guzmán - no answer - left message for her to return call to clinic.

## 2025-02-13 ENCOUNTER — TELEPHONE (OUTPATIENT)
Dept: PRIMARY CARE CLINIC | Facility: CLINIC | Age: 70
End: 2025-02-13

## 2025-02-13 RX ORDER — CHLORTHALIDONE 25 MG/1
25 TABLET ORAL
Qty: 30 TABLET | Refills: 0 | Status: SHIPPED | OUTPATIENT
Start: 2025-02-13

## 2025-02-13 RX ORDER — CELECOXIB 100 MG/1
100 CAPSULE ORAL
Qty: 30 CAPSULE | Refills: 0 | Status: SHIPPED | OUTPATIENT
Start: 2025-02-13

## 2025-02-13 NOTE — TELEPHONE ENCOUNTER
Attempted to call pt to advise her not to take any other OTC NSAIDs while taking celebrex.    No answer - left message to return call to clinic.

## 2025-03-10 DIAGNOSIS — M54.50 CHRONIC LOW BACK PAIN, UNSPECIFIED BACK PAIN LATERALITY, UNSPECIFIED WHETHER SCIATICA PRESENT: ICD-10-CM

## 2025-03-10 DIAGNOSIS — G89.29 CHRONIC LOW BACK PAIN, UNSPECIFIED BACK PAIN LATERALITY, UNSPECIFIED WHETHER SCIATICA PRESENT: ICD-10-CM

## 2025-03-12 RX ORDER — METHOCARBAMOL 500 MG/1
TABLET, FILM COATED ORAL
Qty: 30 TABLET | Refills: 0 | Status: SHIPPED | OUTPATIENT
Start: 2025-03-12

## 2025-03-18 RX ORDER — CELECOXIB 100 MG/1
100 CAPSULE ORAL
Qty: 30 CAPSULE | Refills: 0 | Status: SHIPPED | OUTPATIENT
Start: 2025-03-18

## 2025-03-18 RX ORDER — CHLORTHALIDONE 25 MG/1
25 TABLET ORAL
Qty: 30 TABLET | Refills: 0 | Status: SHIPPED | OUTPATIENT
Start: 2025-03-18

## 2025-03-21 ENCOUNTER — PATIENT OUTREACH (OUTPATIENT)
Dept: ADMINISTRATIVE | Facility: HOSPITAL | Age: 70
End: 2025-03-21

## 2025-03-24 RX ORDER — ATORVASTATIN CALCIUM 40 MG/1
40 TABLET, FILM COATED ORAL NIGHTLY
Qty: 90 TABLET | Refills: 1 | Status: SHIPPED | OUTPATIENT
Start: 2025-03-24 | End: 2025-09-20

## 2025-04-17 RX ORDER — CELECOXIB 100 MG/1
100 CAPSULE ORAL DAILY
Qty: 30 CAPSULE | Refills: 0 | OUTPATIENT
Start: 2025-04-17

## 2025-04-17 RX ORDER — CHLORTHALIDONE 25 MG/1
25 TABLET ORAL DAILY
Qty: 30 TABLET | Refills: 0 | OUTPATIENT
Start: 2025-04-17

## 2025-05-09 RX ORDER — EZETIMIBE 10 MG/1
10 TABLET ORAL
Qty: 90 TABLET | Refills: 0 | Status: SHIPPED | OUTPATIENT
Start: 2025-05-09

## 2025-07-05 DIAGNOSIS — I10 PRIMARY HYPERTENSION: ICD-10-CM

## 2025-07-07 RX ORDER — METOPROLOL SUCCINATE 25 MG/1
25 TABLET, EXTENDED RELEASE ORAL
Qty: 30 TABLET | Refills: 11 | Status: SHIPPED | OUTPATIENT
Start: 2025-07-07

## 2025-08-06 RX ORDER — EZETIMIBE 10 MG/1
10 TABLET ORAL DAILY
Qty: 90 TABLET | Refills: 0 | OUTPATIENT
Start: 2025-08-06

## 2025-08-12 RX ORDER — EZETIMIBE 10 MG/1
10 TABLET ORAL DAILY
Qty: 90 TABLET | Refills: 0 | OUTPATIENT
Start: 2025-08-12